# Patient Record
Sex: MALE | Race: OTHER | NOT HISPANIC OR LATINO | URBAN - METROPOLITAN AREA
[De-identification: names, ages, dates, MRNs, and addresses within clinical notes are randomized per-mention and may not be internally consistent; named-entity substitution may affect disease eponyms.]

---

## 2022-04-21 ENCOUNTER — OUTPATIENT (OUTPATIENT)
Dept: OUTPATIENT SERVICES | Facility: HOSPITAL | Age: 47
LOS: 1 days | Discharge: HOME | End: 2022-04-21
Payer: COMMERCIAL

## 2022-04-21 PROCEDURE — 88305 TISSUE EXAM BY PATHOLOGIST: CPT | Mod: 26

## 2022-04-22 LAB — SURGICAL PATHOLOGY STUDY: SIGNIFICANT CHANGE UP

## 2024-04-10 ENCOUNTER — APPOINTMENT (OUTPATIENT)
Dept: NEUROLOGY | Facility: CLINIC | Age: 49
End: 2024-04-10
Payer: COMMERCIAL

## 2024-04-10 VITALS
SYSTOLIC BLOOD PRESSURE: 115 MMHG | OXYGEN SATURATION: 95 % | WEIGHT: 182 LBS | HEIGHT: 73 IN | TEMPERATURE: 97.7 F | HEART RATE: 83 BPM | BODY MASS INDEX: 24.12 KG/M2 | DIASTOLIC BLOOD PRESSURE: 80 MMHG

## 2024-04-10 DIAGNOSIS — R56.9 UNSPECIFIED CONVULSIONS: ICD-10-CM

## 2024-04-10 DIAGNOSIS — Z78.9 OTHER SPECIFIED HEALTH STATUS: ICD-10-CM

## 2024-04-10 PROCEDURE — 99205 OFFICE O/P NEW HI 60 MIN: CPT

## 2024-04-10 PROCEDURE — G2211 COMPLEX E/M VISIT ADD ON: CPT | Mod: NC,1L

## 2024-04-10 RX ORDER — LEVETIRACETAM 500 MG/1
500 TABLET, FILM COATED ORAL TWICE DAILY
Refills: 0 | Status: ACTIVE | COMMUNITY

## 2024-04-16 ENCOUNTER — NON-APPOINTMENT (OUTPATIENT)
Age: 49
End: 2024-04-16

## 2024-04-16 ENCOUNTER — APPOINTMENT (OUTPATIENT)
Dept: NEUROLOGY | Facility: CLINIC | Age: 49
End: 2024-04-16
Payer: COMMERCIAL

## 2024-04-16 PROCEDURE — 95816 EEG AWAKE AND DROWSY: CPT

## 2024-05-15 ENCOUNTER — INPATIENT (INPATIENT)
Facility: HOSPITAL | Age: 49
LOS: 2 days | Discharge: HOME | DRG: 101 | End: 2024-05-18
Attending: PSYCHIATRY & NEUROLOGY | Admitting: PSYCHIATRY & NEUROLOGY
Payer: COMMERCIAL

## 2024-05-15 VITALS
SYSTOLIC BLOOD PRESSURE: 117 MMHG | HEIGHT: 72 IN | WEIGHT: 179.68 LBS | RESPIRATION RATE: 18 BRPM | OXYGEN SATURATION: 96 % | HEART RATE: 59 BPM | TEMPERATURE: 98 F | DIASTOLIC BLOOD PRESSURE: 86 MMHG

## 2024-05-15 DIAGNOSIS — R56.9 UNSPECIFIED CONVULSIONS: ICD-10-CM

## 2024-05-15 DIAGNOSIS — Z29.9 ENCOUNTER FOR PROPHYLACTIC MEASURES, UNSPECIFIED: ICD-10-CM

## 2024-05-15 DIAGNOSIS — Z98.890 OTHER SPECIFIED POSTPROCEDURAL STATES: Chronic | ICD-10-CM

## 2024-05-15 DIAGNOSIS — Z86.39 PERSONAL HISTORY OF OTHER ENDOCRINE, NUTRITIONAL AND METABOLIC DISEASE: ICD-10-CM

## 2024-05-15 LAB
ALBUMIN SERPL ELPH-MCNC: 4.1 G/DL — SIGNIFICANT CHANGE UP (ref 3.3–5)
ALP SERPL-CCNC: 70 U/L — SIGNIFICANT CHANGE UP (ref 40–120)
ALT FLD-CCNC: 18 U/L — SIGNIFICANT CHANGE UP (ref 10–45)
ANION GAP SERPL CALC-SCNC: 10 MMOL/L — SIGNIFICANT CHANGE UP (ref 5–17)
APTT BLD: 35 SEC — SIGNIFICANT CHANGE UP (ref 24.5–35.6)
AST SERPL-CCNC: 14 U/L — SIGNIFICANT CHANGE UP (ref 10–40)
BASOPHILS # BLD AUTO: 0.08 K/UL — SIGNIFICANT CHANGE UP (ref 0–0.2)
BASOPHILS NFR BLD AUTO: 1.8 % — SIGNIFICANT CHANGE UP (ref 0–2)
BILIRUB SERPL-MCNC: 0.4 MG/DL — SIGNIFICANT CHANGE UP (ref 0.2–1.2)
BUN SERPL-MCNC: 10 MG/DL — SIGNIFICANT CHANGE UP (ref 7–23)
CALCIUM SERPL-MCNC: 9 MG/DL — SIGNIFICANT CHANGE UP (ref 8.4–10.5)
CHLORIDE SERPL-SCNC: 106 MMOL/L — SIGNIFICANT CHANGE UP (ref 96–108)
CO2 SERPL-SCNC: 25 MMOL/L — SIGNIFICANT CHANGE UP (ref 22–31)
CREAT SERPL-MCNC: 0.9 MG/DL — SIGNIFICANT CHANGE UP (ref 0.5–1.3)
EGFR: 104 ML/MIN/1.73M2 — SIGNIFICANT CHANGE UP
EOSINOPHIL # BLD AUTO: 0.13 K/UL — SIGNIFICANT CHANGE UP (ref 0–0.5)
EOSINOPHIL NFR BLD AUTO: 3 % — SIGNIFICANT CHANGE UP (ref 0–6)
GLUCOSE SERPL-MCNC: 103 MG/DL — HIGH (ref 70–99)
HCT VFR BLD CALC: 42.8 % — SIGNIFICANT CHANGE UP (ref 39–50)
HGB BLD-MCNC: 14.6 G/DL — SIGNIFICANT CHANGE UP (ref 13–17)
IMM GRANULOCYTES NFR BLD AUTO: 0.5 % — SIGNIFICANT CHANGE UP (ref 0–0.9)
INR BLD: 0.9 — SIGNIFICANT CHANGE UP (ref 0.85–1.18)
LYMPHOCYTES # BLD AUTO: 1.34 K/UL — SIGNIFICANT CHANGE UP (ref 1–3.3)
LYMPHOCYTES # BLD AUTO: 30.5 % — SIGNIFICANT CHANGE UP (ref 13–44)
MAGNESIUM SERPL-MCNC: 2 MG/DL — SIGNIFICANT CHANGE UP (ref 1.6–2.6)
MCHC RBC-ENTMCNC: 33.7 PG — SIGNIFICANT CHANGE UP (ref 27–34)
MCHC RBC-ENTMCNC: 34.1 GM/DL — SIGNIFICANT CHANGE UP (ref 32–36)
MCV RBC AUTO: 98.8 FL — SIGNIFICANT CHANGE UP (ref 80–100)
MONOCYTES # BLD AUTO: 0.34 K/UL — SIGNIFICANT CHANGE UP (ref 0–0.9)
MONOCYTES NFR BLD AUTO: 7.7 % — SIGNIFICANT CHANGE UP (ref 2–14)
NEUTROPHILS # BLD AUTO: 2.48 K/UL — SIGNIFICANT CHANGE UP (ref 1.8–7.4)
NEUTROPHILS NFR BLD AUTO: 56.5 % — SIGNIFICANT CHANGE UP (ref 43–77)
NRBC # BLD: 0 /100 WBCS — SIGNIFICANT CHANGE UP (ref 0–0)
PHOSPHATE SERPL-MCNC: 4.2 MG/DL — SIGNIFICANT CHANGE UP (ref 2.5–4.5)
PLATELET # BLD AUTO: 225 K/UL — SIGNIFICANT CHANGE UP (ref 150–400)
POTASSIUM SERPL-MCNC: 4.2 MMOL/L — SIGNIFICANT CHANGE UP (ref 3.5–5.3)
POTASSIUM SERPL-SCNC: 4.2 MMOL/L — SIGNIFICANT CHANGE UP (ref 3.5–5.3)
PROT SERPL-MCNC: 5.8 G/DL — LOW (ref 6–8.3)
PROTHROM AB SERPL-ACNC: 10.3 SEC — SIGNIFICANT CHANGE UP (ref 9.5–13)
RBC # BLD: 4.33 M/UL — SIGNIFICANT CHANGE UP (ref 4.2–5.8)
RBC # FLD: 11.6 % — SIGNIFICANT CHANGE UP (ref 10.3–14.5)
SODIUM SERPL-SCNC: 141 MMOL/L — SIGNIFICANT CHANGE UP (ref 135–145)
WBC # BLD: 4.39 K/UL — SIGNIFICANT CHANGE UP (ref 3.8–10.5)
WBC # FLD AUTO: 4.39 K/UL — SIGNIFICANT CHANGE UP (ref 3.8–10.5)

## 2024-05-15 PROCEDURE — 93010 ELECTROCARDIOGRAM REPORT: CPT

## 2024-05-15 PROCEDURE — 99223 1ST HOSP IP/OBS HIGH 75: CPT

## 2024-05-15 RX ORDER — LEVETIRACETAM 250 MG/1
250 TABLET, FILM COATED ORAL AT BEDTIME
Refills: 0 | Status: COMPLETED | OUTPATIENT
Start: 2024-05-15 | End: 2024-05-15

## 2024-05-15 RX ORDER — ACETAMINOPHEN 500 MG
650 TABLET ORAL EVERY 6 HOURS
Refills: 0 | Status: DISCONTINUED | OUTPATIENT
Start: 2024-05-15 | End: 2024-05-18

## 2024-05-15 RX ORDER — LEVETIRACETAM 250 MG/1
500 TABLET, FILM COATED ORAL
Refills: 0 | Status: DISCONTINUED | OUTPATIENT
Start: 2024-05-16 | End: 2024-05-16

## 2024-05-15 RX ADMIN — LEVETIRACETAM 250 MILLIGRAM(S): 250 TABLET, FILM COATED ORAL at 21:02

## 2024-05-15 NOTE — H&P ADULT - ASSESSMENT
48 year-old-right-handed man w/ PMH of hemochromatosis (phlebotomy treatments every 4 months), and eye surgery at age 3 who was admitted on 5/15/24 for event characterization while undergoing vEEG monitoring.

## 2024-05-15 NOTE — H&P ADULT - NSHPPHYSICALEXAM_GEN_ALL_CORE
GENERAL APPEARANCE: Well appearing man who appears to be in no acute distress.  EYES: PERRLA 3mm brisk, Mild left eye strabismus, vision is grossly intact.  EARS: External auditory canals and tympanic membranes clear, hearing grossly intact.  NOSE: No nasal discharge.  CARDIAC: Normal S1 and S2. No S3, S4 or murmurs. Rhythm is regular. There is no peripheral edema, cyanosis or pallor. Extremities are warm and well perfused. Capillary refill is less than 2 seconds. No carotid bruits.  LUNGS: Clear to auscultation   ABDOMEN: Positive bowel sounds. Soft, nondistended, nontender. No guarding or rebound. No masses.  EXTREMITIES: No significant deformity or joint abnormality. No edema. Peripheral pulses intact. No varicosities.  SKIN: Skin normal color, texture and turgor with no lesions or eruptions.    Neurological Exam:  Mental Status: Alert and orientated to self, date and place.  Attention intact.  No dysarthria, aphasia or neglect.  Knowledge intact.  Registration intact.  Short and long term memory grossly intact.      Cranial Nerves: CN I - not tested.  PERRLA 3mm brisk, mild left eye strabismus, VFF, no nystagmus or diplopia.  CN V1-3 intact to light touch.  No facial asymmetry.  Hearing intact to finger rub bilaterally.  Tongue, uvula and palate midline.  Sternocleidomastoid and Trapezius intact bilaterally.    Motor:   Tone: normal.                  Strength:     [] Upper extremity                      Delt       Bicep    Tricep                                                  R         5/5        5/5        5/5       5/5                                               L          5/5        5/5        5/5       5/5  [] Lower extremity                       HF          KE          KF        DF         PF                                               R        5/5        5/5        5/5       5/5       5/5                                               L         5/5        5/5       5/5       5/5        5/5  Pronator drift: none                 Dysmetria: None to finger-nose-finger   Sensation: intact to light touch  Deep Tendon Reflexes: 2+ bilateral biceps, triceps, brachioradialis, knee and ankle  Toes flexor bilaterally  Gait: Heel/toe/tandem walking normal and romberg negative

## 2024-05-15 NOTE — H&P ADULT - NSHPSOCIALHISTORY_GEN_ALL_CORE
man who lives with wife and 4 children aged 16, 14, 11 and 8. Director of live action or animation. Denies smoking, drinking or use of illicit drugs.

## 2024-05-15 NOTE — H&P ADULT - PROBLEM SELECTOR PLAN 1
1 seizure event in lifetime one month ago associated w/ tongue laceration, and was started on Keppra 500mg Q12hrs. Admitted to assess for seizure and potential classification while on vEEG monitoring.    Recommendations:  - Continue vEEG monitoring   - Reduce home keppra 500mg Q12hrs to 250/500mg as of tonight then will re-assess tomorrow  - Ativan 2mg IVP PRN for seizures > 2mins  - Labs including keppra level  - Vital Signs & Neurological assessment Q8hrs  - Maintain Seizure/Fall/Aspiration precautions 1 seizure event in lifetime one month ago associated w/ tongue laceration, and was started on Keppra 500mg Q12hrs. Admitted to assess for seizure and potential classification while on vEEG monitoring.    Recommendations:  - Continue vEEG monitoring   - Initiate Vitamin B-complex to assist with potential mood effects from Keppra   - Reduce home keppra 500mg Q12hrs to 250/500mg as of tonight then will re-assess tomorrow  - Ativan 2mg IVP PRN for seizures > 2mins  - Labs including keppra level  - Vital Signs & Neurological assessment Q8hrs  - Maintain Seizure/Fall/Aspiration precautions

## 2024-05-15 NOTE — PATIENT PROFILE ADULT - INTERNATIONAL TRAVEL
Ultrasound IV by ED Staff Procedure Note    Patient meets criteria for US IV insertion. Ultrasound IV verbal education provided to patient. Opportunities for questions given. IV ultrasound technique used for PIV placement:  20 gauge BD Nexiva 1.75\"  RFA location. 1 X Attempt(s). Procedure tolerated well. Primary RN aware of IV placement and added to LDA.                                 Evert Burroughs RN No

## 2024-05-15 NOTE — H&P ADULT - NSHPREVIEWOFSYSTEMS_GEN_ALL_CORE
CONSTITUTIONAL:  No weight loss, fever, chills, weakness or fatigue.  HEENT:  Eyes:  No visual loss, blurred vision, double vision or yellow sclerae. Ears, Nose, Throat:  No hearing loss, sneezing, congestion, runny nose or sore throat.  SKIN:  No rash or itching.  CARDIOVASCULAR:  No chest pain, chest pressure or chest discomfort. No palpitations or edema.  RESPIRATORY:  No shortness of breath, cough or sputum.  GASTROINTESTINAL:  No anorexia, nausea, vomiting or diarrhea. No abdominal pain or blood.  GENITOURINARY: No Burning on urination.   NEUROLOGICAL:  see HPI  MUSCULOSKELETAL:  +Back pain and muscle soreness  HEMATOLOGIC:  No anemia, bleeding or bruising.  LYMPHATICS:  No enlarged nodes. No history of splenectomy.  PSYCHIATRIC:  No history of depression or anxiety.  ENDOCRINOLOGIC:  No reports of sweating, cold or heat intolerance. No polyuria or polydipsia.  ALLERGIES:  No history of asthma, hives, eczema or rhinitis.

## 2024-05-15 NOTE — H&P ADULT - PROBLEM SELECTOR PLAN 3
Nutrition & Prophylaxis:    F: None  E:  K>4, Mg>2, Phos >3  N: Regular diet  DVT PPx: SCDs  GI PPx: Protonix 40 mg PO QD  Dispo: 7 [place]  Code: FULL CODE

## 2024-05-16 ENCOUNTER — TRANSCRIPTION ENCOUNTER (OUTPATIENT)
Age: 49
End: 2024-05-16

## 2024-05-16 PROCEDURE — 99222 1ST HOSP IP/OBS MODERATE 55: CPT

## 2024-05-16 PROCEDURE — 99232 SBSQ HOSP IP/OBS MODERATE 35: CPT

## 2024-05-16 RX ADMIN — Medication 1 TABLET(S): at 12:18

## 2024-05-16 RX ADMIN — LEVETIRACETAM 500 MILLIGRAM(S): 250 TABLET, FILM COATED ORAL at 09:23

## 2024-05-16 NOTE — DISCHARGE NOTE PROVIDER - NSDCCPCAREPLAN_GEN_ALL_CORE_FT
PRINCIPAL DISCHARGE DIAGNOSIS  Diagnosis: Seizure  Assessment and Plan of Treatment: A seizure is abnormal electrical activity in the brain; the specific cause may or may not be found. Prior to a seizure you may experience a warning sensation (aura) that may include fear, nausea, dizziness, and visual changes such as flashing lights of spots. Common symptoms during the seizure may include an altered mental status, rhythmic jerking movements, drooling, grunting, loss of bladder or bowel control, or tongue biting. After a seizure, you may feel confused and sleepy.   Teach friends and family what to do if you HAVE a seizure which includes laying you on the ground with your head on a cushion and turning you to the side to keep your breathing passages clear in case of vomiting.  SEEK IMMEDIATE MEDICAL CARE IF YOU HAVE ANY OF THE FOLLOWING SYMPTOMS: seizure lasting over 5 minutes, not waking up or persistent altered mental status after the seizure, or more frequent or worsening seizures.  Medicine side effects  *Skin rash  *Fever of 100.4°F (38°C) or higher, or as directed by your provider  *Symptoms get worse or you have new symptoms

## 2024-05-16 NOTE — PROGRESS NOTE ADULT - PROBLEM SELECTOR PLAN 1
1 seizure event in lifetime one month ago associated w/ tongue laceration, and was started on Keppra 500mg Q12hrs. Admitted to assess for seizure and potential classification while on vEEG monitoring. EEG on 5/16 w/ 1 questionable sharp wave form in the left frontal region.     Recommendations:  - Continue vEEG monitoring   - Reduce home keppra 500mg Q12hrs to 250/500mg as of tonight then will re-assess tomorrow  - Ativan 2mg IVP PRN for seizures > 2mins  - Pending keppra level  - Vital Signs & Neurological assessment Q8hrs  - Maintain Seizure/Fall/Aspiration precautions. 1 seizure event in lifetime ~ one month ago associated w/ tongue laceration, and was started on Keppra 500mg Q12hrs. Admitted to assess for seizure and potential classification while on vEEG monitoring. EEG on 5/16 w/ 1 questionable sharp wave form in the left frontal region.     - Continue vEEG monitoring   - 5/15 Keppra reduced to 500mg in AM and 250mg in PM  - 5/16 Keppra reduced to 500mg in AM only  - Ativan 2mg IVP PRN for seizures > 2mins  - Pending keppra level  - Vital Signs & Neurological assessment Q8hrs  - Maintain Seizure/Fall/Aspiration precautions. 1 seizure event in lifetime ~ one month ago associated w/ tongue laceration, and was started on Keppra 500mg Q12hrs. Admitted to assess for seizure and potential classification while on vEEG monitoring. EEG on 5/16 w/ 1 questionable sharp wave form in the left temporal region.     - Continue vEEG monitoring   - 5/15 Keppra reduced to 500mg in AM and 250mg in PM  - 5/16 Keppra reduced to 500mg in AM only  - Ativan 2mg IVP PRN for seizures > 2mins  - Pending keppra level  - Vital Signs & Neurological assessment Q8hrs  - Maintain Seizure/Fall/Aspiration precautions.

## 2024-05-16 NOTE — DISCHARGE NOTE PROVIDER - HOSPITAL COURSE
INCOMPLETE NOTE--------------  EPILEPSY DISCHARGE NOTE:  HPI:  48 year-old-right-handed man w/ PMH of hemochromatosis (phlebotomy treatments every 4 months), and eye surgery at age 3 who was admitted on 5/15/24 for event characterization while undergoing vEEG monitoring. Patient states that he was on vacation in [facility] (purpose viewing [place]), and he was at the poolside, and the next he felt warm and bothered as it was loud, and the next his son and family was over him questioning him. As per outpatient provider Dr Marina's note, patient was witnessed with whole body shaking associated with tongue laceration followed by confusion. He had no prior episodes, but was noted with periods of unresponsiveness within the last 4 months leading up to the event. He was admitted at a local hospital where labs and CTH were unrevealing with the exception of transaminitis likely related to hemachromatosis. He was started on Keppra 500mg BID, reports compliance but states that he notes mild fatigue and potentially feeling down since its initiation. Denies identifiable seizure risks such as family hx of seizures, head trauma w/ LOC, febrile seizures, meningitis or encephalitis, developmental delay, stroke or  complications. He had a routine EEG outpatient that was normal. Denies urinary/fecal incontinence, HA, dizziness, change in vision, flu-like symptoms or recent exposure to sick contacts.    Hospital course include vEEG monitoring from 5/15 - ? that showed? Keppra was reduced on 5/15and showed 1 questionable sharp wave discharge in the left frontal region. ...................., and EEG showed.... from date to date. Additional tests performed on date, and the results revealed ...... Medical issues and the interventions (if any). Patient is stable and medically cleared for discharge to home/subacute rehab/acute rehab.     Medications adjusted:    New Medications:    Education:   Patient given written education on SUDEP: YES    Follow-up:  Please follow-up with Dr Marina in 4 - 6 weeks ( will call you with a date and time).   Please follow-up with your PCP in 2 weeks  Please follow-up with other specialists in 2 - 4 weeks.      INCOMPLETE NOTE--------------  EPILEPSY DISCHARGE NOTE:  HPI:  48 year-old-right-handed man w/ PMH of hemochromatosis (phlebotomy treatments every 4 months), and eye surgery at age 3 who was admitted on 5/15/24 for event characterization while undergoing vEEG monitoring. Patient states that he was on vacation in [facility] (purpose viewing [place]), and he was at the poolside, and the next he felt warm and bothered as it was loud, and the next his son and family was over him questioning him. As per outpatient provider Dr Marina's note, patient was witnessed with whole body shaking associated with tongue laceration followed by confusion. He had no prior episodes, but was noted with periods of unresponsiveness within the last 4 months leading up to the event. He was admitted at a local hospital where labs and CTH were unrevealing with the exception of transaminitis likely related to hemachromatosis. He was started on Keppra 500mg BID, reports compliance but states that he notes mild fatigue and potentially feeling down since its initiation. Denies identifiable seizure risks such as family hx of seizures, head trauma w/ LOC, febrile seizures, meningitis or encephalitis, developmental delay, stroke or  complications. He had a routine EEG outpatient that was normal. Denies urinary/fecal incontinence, HA, dizziness, change in vision, flu-like symptoms or recent exposure to sick contacts.    Hospital course include vEEG monitoring from 5/15 - ? that showed? Keppra was reduced on 5/15and showed 1 questionable sharp wave discharge in the left temporal region. ...................., and EEG showed.... from date to date. Additional tests performed on date, and the results revealed ...... Medical issues and the interventions (if any). Patient is stable and medically cleared for discharge to home/subacute rehab/acute rehab.     Medications adjusted:    New Medications:    Education:   Patient given written education on SUDEP: YES    Follow-up:  Please follow-up with Dr Marina in 4 - 6 weeks ( will call you with a date and time).   Please follow-up with your PCP in 2 weeks  Please follow-up with other specialists in 2 - 4 weeks.      INCOMPLETE NOTE--------------  EPILEPSY DISCHARGE NOTE:  HPI:  48 year-old-right-handed man w/ PMH of hemochromatosis (phlebotomy treatments every 4 months), and eye surgery at age 3 who was admitted on 5/15/24 for event characterization while undergoing vEEG monitoring. Patient states that he was on vacation in [facility] (purpose viewing [place]), and he was at the poolside, and the next he felt warm and bothered as it was loud, and the next his son and family was over him questioning him. As per outpatient provider Dr Marina's note, patient was witnessed with whole body shaking associated with tongue laceration followed by confusion. He had no prior episodes, but was noted with periods of unresponsiveness within the last 4 months leading up to the event. He was admitted at a local hospital where labs and CTH were unrevealing with the exception of transaminitis likely related to hemachromatosis. He was started on Keppra 500mg BID, reports compliance but states that he notes mild fatigue and potentially feeling down since its initiation. Denies identifiable seizure risks such as family hx of seizures, head trauma w/ LOC, febrile seizures, meningitis or encephalitis, developmental delay, stroke or  complications. He had a routine EEG outpatient that was normal. Denies urinary/fecal incontinence, HA, dizziness, change in vision, flu-like symptoms or recent exposure to sick contacts.    Hospital course included vEEG monitoring from 5/15 - . Keppra was reduced on 5/15 and showed 1 questionable sharp wave discharge in the left temporal region. Patient is stable and medically cleared for discharge to home.    Medications adjusted: Keppra increased from 500mg BID to 500mg in AM and 1000mg in PM    New Medications: none    Education:   Patient given written education on SUDEP: YES    Follow-up:  Please follow-up with Dr Marina in 4 - 6 weeks ( will call you with a date and time).   Please follow-up with your PCP in 2 weeks  Please follow-up with other specialists in 2 - 4 weeks.        EPILEPSY DISCHARGE NOTE:  HPI:  48 year-old-right-handed man w/ PMH of hemochromatosis (phlebotomy treatments every 4 months), and eye surgery at age 3 who was admitted on 5/15/24 for event characterization while undergoing vEEG monitoring. Patient states that he was on vacation in [facility] (purpose viewing [email or web address]), and he was at the poolside, and the next he felt warm and bothered as it was loud, and the next his son and family was over him questioning him. As per outpatient provider Dr Marina's note, patient was witnessed with whole body shaking associated with tongue laceration followed by confusion. He had no prior episodes, but was noted with periods of unresponsiveness within the last 4 months leading up to the event. He was admitted at a local hospital where labs and CTH were unrevealing with the exception of transaminitis likely related to hemachromatosis. He was started on Keppra 500mg BID, reports compliance but states that he notes mild fatigue and potentially feeling down since its initiation. Denies identifiable seizure risks such as family hx of seizures, head trauma w/ LOC, febrile seizures, meningitis or encephalitis, developmental delay, stroke or  complications. He had a routine EEG outpatient that was normal. Denies urinary/fecal incontinence, HA, dizziness, change in vision, flu-like symptoms or recent exposure to sick contacts.    Hospital course included vEEG monitoring from 5/15 - . Keppra was reduced on 5/15 and showed 1 questionable sharp wave discharge in the left temporal region. Patient is stable and medically cleared for discharge to home.    Medications adjusted: Keppra increased from 500mg BID to 500mg in AM and 1000mg in PM    New Medications: none    Education:   Patient given written education on SUDEP: YES    Follow-up:  Please follow-up with Dr Marina in 4 - 6 weeks ( will call you with a date and time).   Please follow-up with your PCP in 2 weeks  Please follow-up with other specialists in 2 - 4 weeks.

## 2024-05-16 NOTE — PROGRESS NOTE ADULT - SUBJECTIVE AND OBJECTIVE BOX
EPILEPSY PROGRESS NOTE:   No events overnight. No complaints currently.    REVIEW OF SYSTEMS:   As above, otherwise negative for constitutional/HEENT/CV/pulm/GI//MSK/neuro/derm/endocrine/psych.    MEDICATIONS  (STANDING):  levETIRAcetam 500 milliGRAM(s) Oral <User Schedule>    MEDICATIONS  (PRN):  acetaminophen     Tablet .. 650 milliGRAM(s) Oral every 6 hours PRN Temp greater or equal to 38.5C (101.3F), Mild Pain (1 - 3)  LORazepam   Injectable 2 milliGRAM(s) IV Push every 2 hours PRN Seizure Activity    VITAL SIGNS:   T(C): 36.3 (05-16-24 @ 05:55), Max: 36.7 (05-15-24 @ 13:03)  HR: 60 (05-16-24 @ 05:55) (59 - 65)  BP: 108/68 (05-16-24 @ 05:55) (108/68 - 120/78)  RR: 18 (05-16-24 @ 05:55) (18 - 18)  SpO2: 98% (05-16-24 @ 05:55) (95% - 98%)  Wt(kg): --    PHYSICAL EXAM:   Eyes open spontaneously. Conversational with appropriate sentences.  EOMI. Visual fields full. PERRL 3>2. Face symmetrical.  Full strength throughout.  Finger-nose-finger intact R/L.  Intact to light touch throughout.    LABS:   CBC Full  -  ( 15 May 2024 14:00 )  WBC Count : 4.39 K/uL  RBC Count : 4.33 M/uL  Hemoglobin : 14.6 g/dL  Hematocrit : 42.8 %  Platelet Count - Automated : 225 K/uL  Mean Cell Volume : 98.8 fl  Mean Cell Hemoglobin : 33.7 pg  Mean Cell Hemoglobin Concentration : 34.1 gm/dL  Auto Neutrophil # : 2.48 K/uL  Auto Lymphocyte # : 1.34 K/uL  Auto Monocyte # : 0.34 K/uL  Auto Eosinophil # : 0.13 K/uL  Auto Basophil # : 0.08 K/uL  Auto Neutrophil % : 56.5 %  Auto Lymphocyte % : 30.5 %  Auto Monocyte % : 7.7 %  Auto Eosinophil % : 3.0 %  Auto Basophil % : 1.8 %    05-15    141  |  106  |  10  ----------------------------<  103<H>  4.2   |  25  |  0.90    Ca    9.0      15 May 2024 14:00  Phos  4.2     05-15  Mg     2.0     05-15    TPro  5.8<L>  /  Alb  4.1  /  TBili  0.4  /  DBili  x   /  AST  14  /  ALT  18  /  AlkPhos  70  05-15    LIVER FUNCTIONS - ( 15 May 2024 14:00 )  Alb: 4.1 g/dL / Pro: 5.8 g/dL / ALK PHOS: 70 U/L / ALT: 18 U/L / AST: 14 U/L / GGT: x           PT/INR - ( 15 May 2024 14:00 )   PT: 10.3 sec;   INR: 0.90     PTT - ( 15 May 2024 14:00 )  PTT:35.0 sec       EPILEPSY PROGRESS NOTE:   Patient seen and examined at bedside who reports no event overnight.     REVIEW OF SYSTEMS:   CARDIOVASCULAR:  No chest pain, chest pressure or chest discomfort. No palpitations or edema.  RESPIRATORY:  No shortness of breath, cough or sputum.  GASTROINTESTINAL:  No anorexia, nausea, vomiting or diarrhea. No abdominal pain or blood.  GENITOURINARY: No Burning on urination.   NEUROLOGICAL:  see HPI    MEDICATIONS  (STANDING):  levETIRAcetam 500 milliGRAM(s) Oral <User Schedule>    MEDICATIONS  (PRN):  acetaminophen     Tablet .. 650 milliGRAM(s) Oral every 6 hours PRN Temp greater or equal to 38.5C (101.3F), Mild Pain (1 - 3)  LORazepam   Injectable 2 milliGRAM(s) IV Push every 2 hours PRN Seizure Activity    VITAL SIGNS:   T(C): 36.3 (05-16-24 @ 05:55), Max: 36.7 (05-15-24 @ 13:03)  HR: 60 (05-16-24 @ 05:55) (59 - 65)  BP: 108/68 (05-16-24 @ 05:55) (108/68 - 120/78)  RR: 18 (05-16-24 @ 05:55) (18 - 18)  SpO2: 98% (05-16-24 @ 05:55) (95% - 98%)  Wt(kg): --    PHYSICAL EXAM:   Neurologic:     -Mental Status: Alert and oriented to name, place and date. Speech is fluent with intact naming, repetition, and comprehension, no dysarthria or aphasia. Recent and remote memory intact. Follows commands. Attention/concentration intact. Fund of knowledge appropriate.     -Cranial Nerves:          II: Visual fields are full to confrontation.          III, IV, VI: EOMI without nystagmus. PERRLA 3mm brisk b/l          V:  Facial sensation V1-V3 equal and intact           VII: Face is symmetric with normal eye closure and smile          VIII: Hearing is bilaterally intact to finger rub          IX, X: Uvula is midline and soft palate rises symmetrically          XI: Head turning and shoulder shrug are intact.          XII: Tongue protrudes midline     -Motor: Normal bulk and tone. Strength bilateral upper extremity 5/5, bilateral lower extremities 5/5.      -Sensation: Intact to light touch bilaterally.      -Coordination: No dysmetria on finger-to-nose bilaterally     -Reflexes: Downgoing toes bilaterally      -Gait: Heel/toe/tandem walking normal, Romberg's negative     LABS:   CBC Full  -  ( 15 May 2024 14:00 )  WBC Count : 4.39 K/uL  RBC Count : 4.33 M/uL  Hemoglobin : 14.6 g/dL  Hematocrit : 42.8 %  Platelet Count - Automated : 225 K/uL  Mean Cell Volume : 98.8 fl  Mean Cell Hemoglobin : 33.7 pg  Mean Cell Hemoglobin Concentration : 34.1 gm/dL  Auto Neutrophil # : 2.48 K/uL  Auto Lymphocyte # : 1.34 K/uL  Auto Monocyte # : 0.34 K/uL  Auto Eosinophil # : 0.13 K/uL  Auto Basophil # : 0.08 K/uL  Auto Neutrophil % : 56.5 %  Auto Lymphocyte % : 30.5 %  Auto Monocyte % : 7.7 %  Auto Eosinophil % : 3.0 %  Auto Basophil % : 1.8 %    05-15    141  |  106  |  10  ----------------------------<  103<H>  4.2   |  25  |  0.90    Ca    9.0      15 May 2024 14:00  Phos  4.2     05-15  Mg     2.0     05-15    TPro  5.8<L>  /  Alb  4.1  /  TBili  0.4  /  DBili  x   /  AST  14  /  ALT  18  /  AlkPhos  70  05-15    LIVER FUNCTIONS - ( 15 May 2024 14:00 )  Alb: 4.1 g/dL / Pro: 5.8 g/dL / ALK PHOS: 70 U/L / ALT: 18 U/L / AST: 14 U/L / GGT: x           PT/INR - ( 15 May 2024 14:00 )   PT: 10.3 sec;   INR: 0.90     PTT - ( 15 May 2024 14:00 )  PTT:35.0 sec

## 2024-05-16 NOTE — DISCHARGE NOTE PROVIDER - NSDCFUADDINST_GEN_ALL_CORE_FT
Seizure Safety Instructions  1. [facility] law mandates you to self-report your seizure disorder to [facility], and be seizure-free for 1yr before you can drive.   2. Avoid swimming, diving, taking a bath, cooking, use of motarized machineries.  3. Avoid climbing a ladder, trees or any height.  4. Use machines with safety switches.  5. Always be aware of your surroundings and make sure family and friends are aware of your seizures.  6. Use non-breakable dishes.  7. Consider wearing a medical bracelet to inform people you have epilepsy in case of an emergency.

## 2024-05-16 NOTE — DISCHARGE NOTE PROVIDER - NSDCFUSCHEDAPPT_GEN_ALL_CORE_FT
[facility]  NEUROLOGY [address]  Scheduled Appointment: 07/17/2024     [facility]  NEUROLOGY [address]  Scheduled Appointment: 06/27/2024

## 2024-05-16 NOTE — DISCHARGE NOTE PROVIDER - CARE PROVIDER_API CALL
[facility]  Neurology  [address] [address]  Phone: [phone]  Fax: [phone]  Established Patient  Follow Up Time:

## 2024-05-16 NOTE — CONSULT NOTE ADULT - SUBJECTIVE AND OBJECTIVE BOX
48y old  Male who presents with a chief complaint of Seizure classification (16 May 2024 09:37)      HPI:  48 year-old-right-handed man w/ PMH of hemochromatosis (phlebotomy treatments every 4 months), and eye surgery at age 3 admitted to the hospital for vEEG. Patient states that he was on vacation in [facility] (purpose [employer]), and he was at the poolside, and the next he felt warm and bothered as it was loud, and the next his son and family was over him questioning him. He also reports that  he was witnessed with whole body shaking associated with tongue laceration followed by confusion. He had no prior episodes, but was noted with periods of unresponsiveness within the last 4 months leading up to the event. He was admitted at a local hospital where labs and CTH were unrevealing with the exception of transaminitis likely related to hemachromatosis. He was started on Keppra 500mg BID, reports compliance but states that he notes mild fatigue and potentially feeling down since its initiation.     Denies  seizure risks such as family hx of seizures, head trauma w/ LOC, febrile seizures, meningitis or encephalitis, developmental delay, stroke or  complications. He had a routine EEG outpatient that was normal. Denies urinary/fecal incontinence, HA, dizziness, change in vision, flu-like symptoms or recent exposure to sick contacts.          PAST MEDICAL & SURGICAL HISTORY:  History of hemochromatosis      H/O eye surgery            Allergies    No Known Allergies    Intolerances        FAMILY HISTORY:  No hx of Seizures in family , denies child carrion seizures       Social History:   man who lives with wife and 4 children aged 16, 14, 11 and 8. 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 Denies smoking, drinking or use of illicit drugs. (15 May 2024 16:43)      Home Medications:  Keppra 500 mg oral tablet: 1 tab(s) orally every 12 hours (15 May 2024 17:34)          CURRENT  MEDICATIONS:   acetaminophen     Tablet .. 650 milliGRAM(s) Oral every 6 hours PRN  levETIRAcetam 500 milliGRAM(s) Oral <User Schedule>  LORazepam   Injectable 2 milliGRAM(s) IV Push every 2 hours PRN  Nephro-oumar 1 Tablet(s) Oral daily       Diet, Regular (05-15-24 @ 13:09) [Active]          VITAL SIGNS, INS/OUTS (last 24 hours):  Vital Signs Last 24 Hrs  T(C): 36.7 (16 May 2024 11:07), Max: 36.7 (15 May 2024 13:03)  T(F): 98 (16 May 2024 11:07), Max: 98 (15 May 2024 13:03)  HR: 57 (16 May 2024 11:07) (57 - 65)  BP: 100/84 (16 May 2024 11:07) (100/84 - 120/78)  BP(mean): --  RR: 18 (16 May 2024 11:07) (18 - 18)  SpO2: 98% (16 May 2024 11:07) (95% - 98%)    Parameters below as of 16 May 2024 11:07  Patient On (Oxygen Delivery Method): room air      I&O's Summary      Physical Exam   GENERAL: NAD, lying in bed comfortably  EYES: EOMI, PERRLA, conjunctiva and sclera clear  ENT: Moist mucous membranes, no mucosal lesions, normal dentition   NECK: Supple, No JVD  CHEST/LUNG: Clear to auscultation bilaterally; No rales, rhonchi, wheezing, or rubs. Unlabored respirations  HEART: Regular rate and rhythm; No murmurs, rubs, or gallops  ABDOMEN: Bowel sounds present; Soft, Nontender, Nondistended. No hepatomegaly  EXTREMITIES:  2+ Peripheral Pulses,  No clubbing, cyanosis, or edema  NERVOUS SYSTEM:  Alert & Oriented X3, speech clear. No deficits   MSK: FROM all 4 extremities, full and equal strength  SKIN: No rashes or lesions    LABS:                        14.6   4.39  )-----------( 225      ( 15 May 2024 14:00 )             42.8     05-15    141  |  106  |  10  ----------------------------<  103<H>  4.2   |  25  |  0.90    Ca    9.0      15 May 2024 14:00  Phos  4.2     05-15  Mg     2.0     05-15    TPro  5.8<L>  /  Alb  4.1  /  TBili  0.4  /  DBili  x   /  AST  14  /  ALT  18  /  AlkPhos  70  05-15    PT/INR - ( 15 May 2024 14:00 )   PT: 10.3 sec;   INR: 0.90          PTT - ( 15 May 2024 14:00 )  PTT:35.0 sec  Urinalysis Basic - ( 15 May 2024 14:00 )    Color: x / Appearance: x / SG: x / pH: x  Gluc: 103 mg/dL / Ketone: x  / Bili: x / Urobili: x   Blood: x / Protein: x / Nitrite: x   Leuk Esterase: x / RBC: x / WBC x   Sq Epi: x / Non Sq Epi: x / Bacteria: x      CAPILLARY BLOOD GLUCOSE

## 2024-05-16 NOTE — PROGRESS NOTE ADULT - ASSESSMENT
48 year-old-right-handed man w/ PMH of hemochromatosis (phlebotomy treatments every 4 months), and eye surgery at age 3 who was admitted on 5/15/24 for event characterization while undergoing vEEG monitoring. EEG thus far with 1 questionable left frontal sharp wave form.  48 year-old-right-handed man w/ PMH of hemochromatosis (phlebotomy treatments every 4 months), and eye surgery at age 3 who was admitted on 5/15/24 for event characterization while undergoing vEEG monitoring. EEG thus far with 1 questionable left temporal sharp wave form.

## 2024-05-16 NOTE — DISCHARGE NOTE PROVIDER - NSDCMRMEDTOKEN_GEN_ALL_CORE_FT
Keppra 500 mg oral tablet: 1 tab(s) orally every 12 hours   levETIRAcetam 1000 mg oral tablet: 1 tab(s) orally every 24 hours  levETIRAcetam 500 mg oral tablet: 1 tab(s) orally every 24 hours

## 2024-05-16 NOTE — CONSULT NOTE ADULT - ASSESSMENT
48y old  Male who presents with a chief complaint of Seizure classification (16 May 2024 09:37)      Impression and Plan   # 1 episode of Seizure 2 months ago   - vEEG for now   - F/u Epilepsy team plans     # Hemochromatosis   - phlebotomy every 4months , F/u outpatient with Hem- Onc    #DVT PPx - Improve VTE 1 , no need for chemical prophylaxis     #Dispo -  home

## 2024-05-17 VITALS
SYSTOLIC BLOOD PRESSURE: 113 MMHG | HEART RATE: 61 BPM | RESPIRATION RATE: 18 BRPM | OXYGEN SATURATION: 94 % | DIASTOLIC BLOOD PRESSURE: 75 MMHG | TEMPERATURE: 98 F

## 2024-05-17 LAB — LEVETIRACETAM SERPL-MCNC: 10.6 UG/ML — SIGNIFICANT CHANGE UP (ref 10–40)

## 2024-05-17 PROCEDURE — 99231 SBSQ HOSP IP/OBS SF/LOW 25: CPT

## 2024-05-17 PROCEDURE — 99232 SBSQ HOSP IP/OBS MODERATE 35: CPT

## 2024-05-17 RX ORDER — LEVETIRACETAM 250 MG/1
500 TABLET, FILM COATED ORAL ONCE
Refills: 0 | Status: COMPLETED | OUTPATIENT
Start: 2024-05-17 | End: 2024-05-17

## 2024-05-17 RX ORDER — LEVETIRACETAM 250 MG/1
1 TABLET, FILM COATED ORAL
Qty: 30 | Refills: 2
Start: 2024-05-17 | End: 2024-08-14

## 2024-05-17 RX ORDER — LEVETIRACETAM 250 MG/1
500 TABLET, FILM COATED ORAL EVERY 24 HOURS
Refills: 0 | Status: DISCONTINUED | OUTPATIENT
Start: 2024-05-18 | End: 2024-05-18

## 2024-05-17 RX ORDER — LEVETIRACETAM 500 MG/1
500 TABLET, FILM COATED ORAL
Qty: 90 | Refills: 5 | Status: ACTIVE | COMMUNITY
Start: 2024-04-10 | End: 1900-01-01

## 2024-05-17 RX ORDER — LEVETIRACETAM 250 MG/1
1000 TABLET, FILM COATED ORAL EVERY 24 HOURS
Refills: 0 | Status: DISCONTINUED | OUTPATIENT
Start: 2024-05-17 | End: 2024-05-18

## 2024-05-17 RX ORDER — LEVETIRACETAM 250 MG/1
1 TABLET, FILM COATED ORAL
Refills: 0 | DISCHARGE

## 2024-05-17 RX ORDER — LEVETIRACETAM 250 MG/1
1 TABLET, FILM COATED ORAL
Qty: 0 | Refills: 0 | DISCHARGE
Start: 2024-05-17

## 2024-05-17 RX ADMIN — LEVETIRACETAM 500 MILLIGRAM(S): 250 TABLET, FILM COATED ORAL at 12:47

## 2024-05-17 RX ADMIN — LEVETIRACETAM 1000 MILLIGRAM(S): 250 TABLET, FILM COATED ORAL at 21:34

## 2024-05-17 RX ADMIN — Medication 1 TABLET(S): at 12:47

## 2024-05-17 NOTE — PROGRESS NOTE ADULT - PROBLEM SELECTOR PLAN 1
1 seizure event in lifetime ~ one month ago associated w/ tongue laceration, and was started on Keppra 500mg Q12hrs. Admitted to assess for seizure and potential classification while on vEEG monitoring. EEG on 5/16 w/ 1 questionable sharp wave form in the left temporal region.     - Continue vEEG monitoring   - 5/15 Keppra reduced to 500mg in AM and 250mg in PM  - 5/16 Keppra reduced to 500mg in AM only  - 5/17 Off Keppra  - Ativan 2mg IVP PRN for seizures > 2mins  - Vital Signs & Neurological assessment Q8hrs  - Maintain Seizure/Fall/Aspiration precautions. 1 seizure event in lifetime ~ one month ago associated w/ tongue laceration, and was started on Keppra 500mg Q12hrs. Admitted to assess for seizure and potential classification while on vEEG monitoring. EEG on 5/16 w/ 1 questionable sharp wave form in the left temporal region.     - Continue vEEG monitoring   - 5/15 Keppra reduced to 500mg in AM and 250mg in PM  - 5/16 Keppra reduced to 500mg in AM only  - 5/17 Will restart Keppra, plan for 500mg in AM and 1g in PM  - Ativan 2mg IVP PRN for seizures > 2mins  - Vital Signs & Neurological assessment Q8hrs  - Maintain Seizure/Fall/Aspiration precautions.

## 2024-05-17 NOTE — PROGRESS NOTE ADULT - SUBJECTIVE AND OBJECTIVE BOX
Neurology Progress Note    Interval History:    No events overnight. Patient has no complaints this morning.    Medications:  acetaminophen     Tablet .. 650 milliGRAM(s) Oral every 6 hours PRN  LORazepam   Injectable 2 milliGRAM(s) IV Push every 2 hours PRN  Nephro-oumar 1 Tablet(s) Oral daily      Vital Signs Last 24 Hrs  T(C): 36.4 (17 May 2024 05:44), Max: 36.7 (16 May 2024 11:07)  T(F): 97.6 (17 May 2024 05:44), Max: 98 (16 May 2024 11:07)  HR: 55 (17 May 2024 05:44) (55 - 57)  BP: 107/69 (17 May 2024 05:44) (100/84 - 122/81)  BP(mean): --  RR: 18 (17 May 2024 05:44) (18 - 18)  SpO2: 97% (17 May 2024 05:44) (97% - 98%)    Parameters below as of 16 May 2024 20:30  Patient On (Oxygen Delivery Method): room air        Neurological Examination:  General:  Appearance is consistent with chronologic age.  No abnormal facies.  Gross skin survey within normal limits.    Cognitive/Language:  Awake, alert, and oriented to person, place, time and date.  Nondysarthric.    Cranial Nerves  - Eyes:  Visual fields full.  EOMI w/o nystagmus, skew or reported double vision. No ptosis/weakness of eyelid closure.    - Face:  No gross facial asymmetry.    - Ears/Nose/Throat:  Hearing grossly intact  Motor examination:  Upper Extremities: L 5/5, R 5/5; Lower extremities: L 5/5, R 5/5  Sensory examination:   Intact to light touch throughout  Cerebellum:   Gait deferred    Labs:  CBC Full  -  ( 15 May 2024 14:00 )  WBC Count : 4.39 K/uL  RBC Count : 4.33 M/uL  Hemoglobin : 14.6 g/dL  Hematocrit : 42.8 %  Platelet Count - Automated : 225 K/uL  Mean Cell Volume : 98.8 fl  Mean Cell Hemoglobin : 33.7 pg  Mean Cell Hemoglobin Concentration : 34.1 gm/dL  Auto Neutrophil # : 2.48 K/uL  Auto Lymphocyte # : 1.34 K/uL  Auto Monocyte # : 0.34 K/uL  Auto Eosinophil # : 0.13 K/uL  Auto Basophil # : 0.08 K/uL  Auto Neutrophil % : 56.5 %  Auto Lymphocyte % : 30.5 %  Auto Monocyte % : 7.7 %  Auto Eosinophil % : 3.0 %  Auto Basophil % : 1.8 %    05-15    141  |  106  |  10  ----------------------------<  103<H>  4.2   |  25  |  0.90    Ca    9.0      15 May 2024 14:00  Phos  4.2     05-15  Mg     2.0     05-15    TPro  5.8<L>  /  Alb  4.1  /  TBili  0.4  /  DBili  x   /  AST  14  /  ALT  18  /  AlkPhos  70  05-15    LIVER FUNCTIONS - ( 15 May 2024 14:00 )  Alb: 4.1 g/dL / Pro: 5.8 g/dL / ALK PHOS: 70 U/L / ALT: 18 U/L / AST: 14 U/L / GGT: x           PT/INR - ( 15 May 2024 14:00 )   PT: 10.3 sec;   INR: 0.90          PTT - ( 15 May 2024 14:00 )  PTT:35.0 sec  Urinalysis Basic - ( 15 May 2024 14:00 )    Color: x / Appearance: x / SG: x / pH: x  Gluc: 103 mg/dL / Ketone: x  / Bili: x / Urobili: x   Blood: x / Protein: x / Nitrite: x   Leuk Esterase: x / RBC: x / WBC x   Sq Epi: x / Non Sq Epi: x / Bacteria: x

## 2024-05-17 NOTE — PROGRESS NOTE ADULT - ASSESSMENT
48y old  Male w/ pmhx of  hemochromatosis p/w  Seizure.    #Seizure   -Continue EEG  -Seizure precautions   - Management as per Epilepsy   -Pt is for Keppra load as per Epilepsy team     # Hemochromatosis   -Pt is for  phlebotomy every 4months   -Will f/u outpatient with Hem- Onc    #DVT PPx   - As per primary team     #Dispo  -Pt is likely for d/c tomorrow as per Epilepsy team     40 minutes spent on total encounter. The necessity of the time spent during the encounter on this date of service was due to:    Review of hospital course, labs, vitals, radiology and medical records.  Direct patient encounter including bedside exam   Discussed plan of care with primary team   Documenting the encounter.

## 2024-05-17 NOTE — PROGRESS NOTE ADULT - SUBJECTIVE AND OBJECTIVE BOX
Patient was seen and examined at bedside. Case discuss with the primary  team. Pt without any complaints this morning.     OBJECTIVE:  Vital Signs Last 24 Hrs  T(C): 36.4 (17 May 2024 05:44), Max: 36.5 (16 May 2024 20:30)  T(F): 97.6 (17 May 2024 05:44), Max: 97.7 (16 May 2024 20:30)  HR: 55 (17 May 2024 05:44) (55 - 55)  BP: 107/69 (17 May 2024 05:44) (107/69 - 122/81)  RR: 18 (17 May 2024 05:44) (18 - 18)  SpO2: 97% (17 May 2024 05:44) (97% - 98%)    Parameters below as of 16 May 2024 20:30  Patient On (Oxygen Delivery Method): room air    PHYSICAL EXAM:  Gen: NAD laying in bed  CV: RRR, +S1/S2, no mumur  Pulm: CTA b/l no wheezing or crackles   Abd: soft, NTND + BS no rebound or guarding   Neuro: AAOx3; sensation intact; strength 5/5 b/l UE and LE     LABS:                        14.6   4.39  )-----------( 225      ( 15 May 2024 14:00 )             42.8     05-15    141  |  106  |  10  ----------------------------<  103<H>  4.2   |  25  |  0.90    Ca    9.0      15 May 2024 14:00  Phos  4.2     05-15  Mg     2.0     05-15    TPro  5.8<L>  /  Alb  4.1  /  TBili  0.4  /  DBili  x   /  AST  14  /  ALT  18  /  AlkPhos  70  05-15    LIVER FUNCTIONS - ( 15 May 2024 14:00 )  Alb: 4.1 g/dL / Pro: 5.8 g/dL / ALK PHOS: 70 U/L / ALT: 18 U/L / AST: 14 U/L / GGT: x           PT: 10.3 sec;   INR: 0.90    PTT:35.0 sec    acetaminophen     Tablet .. 650 milliGRAM(s) Oral every 6 hours PRN  levETIRAcetam 500 milliGRAM(s) Oral once  levETIRAcetam 1000 milliGRAM(s) Oral every 24 hours  LORazepam   Injectable 2 milliGRAM(s) IV Push every 2 hours PRN  Nephro-oumar 1 Tablet(s) Oral daily

## 2024-05-17 NOTE — PROGRESS NOTE ADULT - ASSESSMENT
48 year-old-right-handed man w/ PMH of hemochromatosis (phlebotomy treatments every 4 months), and eye surgery at age 3 who was admitted on 5/15/24 for event characterization while undergoing vEEG monitoring. EEG thus far with 1 questionable left temporal sharp wave form.

## 2024-05-18 ENCOUNTER — TRANSCRIPTION ENCOUNTER (OUTPATIENT)
Age: 49
End: 2024-05-18

## 2024-05-18 PROCEDURE — 80053 COMPREHEN METABOLIC PANEL: CPT

## 2024-05-18 PROCEDURE — 99232 SBSQ HOSP IP/OBS MODERATE 35: CPT

## 2024-05-18 PROCEDURE — 85025 COMPLETE CBC W/AUTO DIFF WBC: CPT

## 2024-05-18 PROCEDURE — 95716 VEEG EA 12-26HR CONT MNTR: CPT

## 2024-05-18 PROCEDURE — 93005 ELECTROCARDIOGRAM TRACING: CPT

## 2024-05-18 PROCEDURE — 80177 DRUG SCRN QUAN LEVETIRACETAM: CPT

## 2024-05-18 PROCEDURE — 85610 PROTHROMBIN TIME: CPT

## 2024-05-18 PROCEDURE — 99239 HOSP IP/OBS DSCHRG MGMT >30: CPT

## 2024-05-18 PROCEDURE — 85730 THROMBOPLASTIN TIME PARTIAL: CPT

## 2024-05-18 PROCEDURE — 36415 COLL VENOUS BLD VENIPUNCTURE: CPT

## 2024-05-18 PROCEDURE — 95700 EEG CONT REC W/VID EEG TECH: CPT

## 2024-05-18 PROCEDURE — 83735 ASSAY OF MAGNESIUM: CPT

## 2024-05-18 PROCEDURE — 84100 ASSAY OF PHOSPHORUS: CPT

## 2024-05-18 RX ADMIN — LEVETIRACETAM 500 MILLIGRAM(S): 250 TABLET, FILM COATED ORAL at 09:03

## 2024-05-18 NOTE — PROGRESS NOTE ADULT - PROBLEM SELECTOR PLAN 3
Nutrition & Prophylaxis:    F: None  E:  K>4, Mg>2, Phos >3  N: Regular diet  DVT PPx: SCDs  GI PPx: Protonix 40 mg PO QD  Dispo: 7 [place]  Code: FULL CODE.

## 2024-05-18 NOTE — PROGRESS NOTE ADULT - SUBJECTIVE AND OBJECTIVE BOX
Neurology Progress Note    Interval History:    Patient was seen and examined, no acute event over night.     Medications:  acetaminophen     Tablet .. 650 milliGRAM(s) Oral every 6 hours PRN  levETIRAcetam 500 milliGRAM(s) Oral every 24 hours  levETIRAcetam 1000 milliGRAM(s) Oral every 24 hours  LORazepam   Injectable 2 milliGRAM(s) IV Push every 2 hours PRN  Nephro-oumar 1 Tablet(s) Oral daily      Vital Signs Last 24 Hrs  T(C): 36.8 (17 May 2024 20:30), Max: 36.8 (17 May 2024 20:30)  T(F): 98.3 (17 May 2024 20:30), Max: 98.3 (17 May 2024 20:30)  HR: 61 (17 May 2024 20:30) (61 - 65)  BP: 113/75 (17 May 2024 20:30) (112/67 - 113/75)  BP(mean): --  RR: 18 (17 May 2024 20:30) (16 - 18)  SpO2: 94% (17 May 2024 20:30) (94% - 96%)    Parameters below as of 17 May 2024 20:30  Patient On (Oxygen Delivery Method): room air      Neurological Examination:  General:  Appearance is consistent with chronologic age.  No abnormal facies.  Gross skin survey within normal limits.    Cognitive/Language:  Awake, alert, and oriented to person, place, time and date.  Nondysarthric.    Cranial Nerves  - Eyes:  Visual fields full.  EOMI w/o nystagmus, skew or reported double vision. No ptosis/weakness of eyelid closure.    - Face:  No gross facial asymmetry.    - Ears/Nose/Throat:  Hearing grossly intact  Motor examination:  Upper Extremities: L 5/5, R 5/5; Lower extremities: L 5/5, R 5/5  Sensory examination:   Intact to light touch throughout  Cerebellum:   Gait deferred

## 2024-05-18 NOTE — PROGRESS NOTE ADULT - SUBJECTIVE AND OBJECTIVE BOX
Patient was seen and examined at bedside. Case discuss with the primary  team. Pt without any complaints or events overnight.     OBJECTIVE:  Vital Signs Last 24 Hrs  T(C): 36.8 (17 May 2024 20:30), Max: 36.8 (17 May 2024 20:30)  T(F): 98.3 (17 May 2024 20:30), Max: 98.3 (17 May 2024 20:30)  HR: 61 (17 May 2024 20:30) (61 - 65)  BP: 113/75 (17 May 2024 20:30) (112/67 - 113/75)  RR: 18 (17 May 2024 20:30) (16 - 18)  SpO2: 94% (17 May 2024 20:30) (94% - 96%)    Parameters below as of 17 May 2024 20:30  Patient On (Oxygen Delivery Method): room air    PHYSICAL EXAM:  Gen: NAD laying in bed  CV: RRR, +S1/S2, no mumur  Pulm: CTA b/l no wheezing or crackles   Abd: soft, NTND + BS no rebound or guarding     acetaminophen     Tablet .. 650 milliGRAM(s) Oral every 6 hours PRN  levETIRAcetam 500 milliGRAM(s) Oral every 24 hours  levETIRAcetam 1000 milliGRAM(s) Oral every 24 hours  LORazepam   Injectable 2 milliGRAM(s) IV Push every 2 hours PRN  Nephro-oumar 1 Tablet(s) Oral daily

## 2024-05-18 NOTE — PROGRESS NOTE ADULT - ASSESSMENT
48y old  Male w/ pmhx of  hemochromatosis p/w seizure.    #Seizure   -Continue EEG  -Seizure precautions   - Management as per Epilepsy   -Continue Keppra    # Hemochromatosis   -Pt is for phlebotomy every 4months   -Will f/u outpatient with Hem- Onc    #DVT PPx   - As per primary team     #Dispo  -Pt is for d/c today     40 minutes spent on total encounter. The necessity of the time spent during the encounter on this date of service was due to:    Review of hospital course, labs, vitals, radiology and medical records.  Direct patient encounter including bedside exam   Discussed plan of care with primary team   Documenting the encounter.

## 2024-05-18 NOTE — PROGRESS NOTE ADULT - PROBLEM SELECTOR PLAN 2
Hx of hemochromatosis    - Phlebotomy every 4 months and F/U w/ hematologist.

## 2024-05-18 NOTE — DISCHARGE NOTE NURSING/CASE MANAGEMENT/SOCIAL WORK - PATIENT PORTAL LINK FT
You can access the FollowMyHealth Patient Portal offered by [facility] by registering at the following website: [email or web address]. By joining [employer]’s FollowMyHealth portal, you will also be able to view your health information using other applications (apps) compatible with our system.

## 2024-05-18 NOTE — PROGRESS NOTE ADULT - PROBLEM SELECTOR PLAN 1
1 seizure event in lifetime ~ one month ago associated w/ tongue laceration, and was started on Keppra 500mg Q12hrs. Admitted to assess for seizure and potential classification while on vEEG monitoring. EEG on 5/16 w/ 1 questionable sharp wave form in the left temporal region.     - Continue vEEG monitoring   - 5/15 Keppra reduced to 500mg in AM and 250mg in PM  - 5/16 Keppra reduced to 500mg in AM only  - 5/17 Will restart Keppra, plan for 500mg in AM and 1g in PM  - Ativan 2mg IVP PRN for seizures > 2mins  - Vital Signs & Neurological assessment Q8hrs  - Maintain Seizure/Fall/Aspiration precautions.

## 2024-05-18 NOTE — PROGRESS NOTE ADULT - PROBLEM SELECTOR PROBLEM 2
History of hemochromatosis
Spine appears normal, range of motion is not limited, no muscle or joint tenderness. No calf edema/tenderness.

## 2024-05-18 NOTE — DISCHARGE NOTE NURSING/CASE MANAGEMENT/SOCIAL WORK - NSDCPEFALRISK_GEN_ALL_CORE
For information on Fall & Injury Prevention, visit: [email or web address] OR  [email or web address] OR  https://www.cdc.gov/steadi/patient.html

## 2024-05-24 DIAGNOSIS — R56.9 UNSPECIFIED CONVULSIONS: ICD-10-CM

## 2024-05-24 DIAGNOSIS — E83.119 HEMOCHROMATOSIS, UNSPECIFIED: ICD-10-CM

## 2024-06-27 ENCOUNTER — APPOINTMENT (OUTPATIENT)
Dept: NEUROLOGY | Facility: CLINIC | Age: 49
End: 2024-06-27
Payer: COMMERCIAL

## 2024-06-27 VITALS
DIASTOLIC BLOOD PRESSURE: 81 MMHG | BODY MASS INDEX: 23.6 KG/M2 | OXYGEN SATURATION: 98 % | WEIGHT: 178.05 LBS | HEIGHT: 73 IN | SYSTOLIC BLOOD PRESSURE: 118 MMHG | TEMPERATURE: 97.3 F | HEART RATE: 55 BPM

## 2024-06-27 PROCEDURE — G2211 COMPLEX E/M VISIT ADD ON: CPT | Mod: NC

## 2024-06-27 PROCEDURE — 99214 OFFICE O/P EST MOD 30 MIN: CPT

## 2024-08-15 PROBLEM — Z86.39 PERSONAL HISTORY OF OTHER ENDOCRINE, NUTRITIONAL AND METABOLIC DISEASE: Chronic | Status: ACTIVE | Noted: 2024-05-15

## 2024-09-18 ENCOUNTER — APPOINTMENT (OUTPATIENT)
Dept: NEUROLOGY | Facility: CLINIC | Age: 49
End: 2024-09-18
Payer: COMMERCIAL

## 2024-09-18 PROCEDURE — 99213 OFFICE O/P EST LOW 20 MIN: CPT | Mod: 95

## 2024-09-18 PROCEDURE — G2211 COMPLEX E/M VISIT ADD ON: CPT | Mod: NC

## 2024-09-18 RX ORDER — LEVETIRACETAM 500 MG/1
500 TABLET, FILM COATED, EXTENDED RELEASE ORAL
Qty: 60 | Refills: 5 | Status: ACTIVE | COMMUNITY
Start: 2024-09-18 | End: 1900-01-01

## 2024-09-26 ENCOUNTER — APPOINTMENT (OUTPATIENT)
Dept: MRI IMAGING | Age: 49
End: 2024-09-26

## 2024-09-26 PROCEDURE — 70551 MRI BRAIN STEM W/O DYE: CPT

## 2024-09-26 PROCEDURE — 0866T QUAN MRI ALYS BRN W/DX MRI: CPT

## 2024-12-18 ENCOUNTER — APPOINTMENT (OUTPATIENT)
Dept: NEUROLOGY | Facility: CLINIC | Age: 49
End: 2024-12-18
Payer: COMMERCIAL

## 2024-12-18 ENCOUNTER — NON-APPOINTMENT (OUTPATIENT)
Age: 49
End: 2024-12-18

## 2024-12-18 VITALS
HEART RATE: 63 BPM | OXYGEN SATURATION: 99 % | TEMPERATURE: 98.9 F | SYSTOLIC BLOOD PRESSURE: 125 MMHG | WEIGHT: 176 LBS | BODY MASS INDEX: 23.33 KG/M2 | HEIGHT: 73 IN | DIASTOLIC BLOOD PRESSURE: 84 MMHG

## 2024-12-18 PROCEDURE — 99213 OFFICE O/P EST LOW 20 MIN: CPT

## 2024-12-18 PROCEDURE — G2211 COMPLEX E/M VISIT ADD ON: CPT | Mod: NC

## 2024-12-23 RX ORDER — LACOSAMIDE 50 MG/1
50 TABLET ORAL
Qty: 120 | Refills: 3 | Status: ACTIVE | OUTPATIENT
Start: 2024-12-18

## 2025-05-05 ENCOUNTER — INPATIENT (INPATIENT)
Facility: HOSPITAL | Age: 50
LOS: 1 days | Discharge: HOME | End: 2025-05-07
Attending: PSYCHIATRY & NEUROLOGY | Admitting: PSYCHIATRY & NEUROLOGY
Payer: MEDICAID

## 2025-05-05 VITALS
SYSTOLIC BLOOD PRESSURE: 124 MMHG | TEMPERATURE: 98 F | HEIGHT: 73 IN | OXYGEN SATURATION: 97 % | RESPIRATION RATE: 16 BRPM | HEART RATE: 55 BPM | DIASTOLIC BLOOD PRESSURE: 87 MMHG

## 2025-05-05 DIAGNOSIS — R56.9 UNSPECIFIED CONVULSIONS: ICD-10-CM

## 2025-05-05 DIAGNOSIS — Z98.890 OTHER SPECIFIED POSTPROCEDURAL STATES: Chronic | ICD-10-CM

## 2025-05-05 DIAGNOSIS — Z29.9 ENCOUNTER FOR PROPHYLACTIC MEASURES, UNSPECIFIED: ICD-10-CM

## 2025-05-05 LAB
ALBUMIN SERPL ELPH-MCNC: 5 G/DL — SIGNIFICANT CHANGE UP (ref 3.3–5)
ALP SERPL-CCNC: 64 U/L — SIGNIFICANT CHANGE UP (ref 40–120)
ALT FLD-CCNC: 17 U/L — SIGNIFICANT CHANGE UP (ref 10–45)
ANION GAP SERPL CALC-SCNC: 13 MMOL/L — SIGNIFICANT CHANGE UP (ref 5–17)
APTT BLD: 33.3 SEC — SIGNIFICANT CHANGE UP (ref 26.1–36.8)
AST SERPL-CCNC: 16 U/L — SIGNIFICANT CHANGE UP (ref 10–40)
BILIRUB SERPL-MCNC: 0.6 MG/DL — SIGNIFICANT CHANGE UP (ref 0.2–1.2)
BUN SERPL-MCNC: 10 MG/DL — SIGNIFICANT CHANGE UP (ref 7–23)
CALCIUM SERPL-MCNC: 10 MG/DL — SIGNIFICANT CHANGE UP (ref 8.4–10.5)
CHLORIDE SERPL-SCNC: 106 MMOL/L — SIGNIFICANT CHANGE UP (ref 96–108)
CO2 SERPL-SCNC: 24 MMOL/L — SIGNIFICANT CHANGE UP (ref 22–31)
CREAT SERPL-MCNC: 0.86 MG/DL — SIGNIFICANT CHANGE UP (ref 0.5–1.3)
EGFR: 105 ML/MIN/1.73M2 — SIGNIFICANT CHANGE UP
EGFR: 105 ML/MIN/1.73M2 — SIGNIFICANT CHANGE UP
GLUCOSE SERPL-MCNC: 117 MG/DL — HIGH (ref 70–99)
HCT VFR BLD CALC: 47.1 % — SIGNIFICANT CHANGE UP (ref 39–50)
HGB BLD-MCNC: 16.5 G/DL — SIGNIFICANT CHANGE UP (ref 13–17)
INR BLD: 0.95 — SIGNIFICANT CHANGE UP (ref 0.85–1.16)
MAGNESIUM SERPL-MCNC: 2.3 MG/DL — SIGNIFICANT CHANGE UP (ref 1.6–2.6)
MCHC RBC-ENTMCNC: 32 PG — SIGNIFICANT CHANGE UP (ref 27–34)
MCHC RBC-ENTMCNC: 35 G/DL — SIGNIFICANT CHANGE UP (ref 32–36)
MCV RBC AUTO: 91.3 FL — SIGNIFICANT CHANGE UP (ref 80–100)
NRBC BLD AUTO-RTO: 0 /100 WBCS — SIGNIFICANT CHANGE UP (ref 0–0)
PHOSPHATE SERPL-MCNC: 3.9 MG/DL — SIGNIFICANT CHANGE UP (ref 2.5–4.5)
PLATELET # BLD AUTO: 253 K/UL — SIGNIFICANT CHANGE UP (ref 150–400)
POTASSIUM SERPL-MCNC: 5.7 MMOL/L — HIGH (ref 3.5–5.3)
POTASSIUM SERPL-SCNC: 5.7 MMOL/L — HIGH (ref 3.5–5.3)
PROT SERPL-MCNC: 7.2 G/DL — SIGNIFICANT CHANGE UP (ref 6–8.3)
PROTHROM AB SERPL-ACNC: 10.9 SEC — SIGNIFICANT CHANGE UP (ref 9.9–13.4)
RBC # BLD: 5.16 M/UL — SIGNIFICANT CHANGE UP (ref 4.2–5.8)
RBC # FLD: 12.6 % — SIGNIFICANT CHANGE UP (ref 10.3–14.5)
SODIUM SERPL-SCNC: 143 MMOL/L — SIGNIFICANT CHANGE UP (ref 135–145)
WBC # BLD: 5.2 K/UL — SIGNIFICANT CHANGE UP (ref 3.8–10.5)
WBC # FLD AUTO: 5.2 K/UL — SIGNIFICANT CHANGE UP (ref 3.8–10.5)

## 2025-05-05 PROCEDURE — 93010 ELECTROCARDIOGRAM REPORT: CPT

## 2025-05-05 PROCEDURE — 99223 1ST HOSP IP/OBS HIGH 75: CPT

## 2025-05-05 RX ORDER — ENOXAPARIN SODIUM 100 MG/ML
40 INJECTION SUBCUTANEOUS EVERY 24 HOURS
Refills: 0 | Status: DISCONTINUED | OUTPATIENT
Start: 2025-05-05 | End: 2025-05-07

## 2025-05-05 RX ORDER — ACETAMINOPHEN 500 MG/5ML
650 LIQUID (ML) ORAL EVERY 6 HOURS
Refills: 0 | Status: DISCONTINUED | OUTPATIENT
Start: 2025-05-05 | End: 2025-05-07

## 2025-05-05 RX ORDER — LORAZEPAM 4 MG/ML
2 VIAL (ML) INJECTION ONCE
Refills: 0 | Status: DISCONTINUED | OUTPATIENT
Start: 2025-05-05 | End: 2025-05-07

## 2025-05-05 NOTE — H&P ADULT - NSHPSOCIALHISTORY_GEN_ALL_CORE
Lives with wife, and 3 sons and 1 daughter. Freelancer in commercial advertising. Sober over 1 year (stopped drinking after 3/2024 seizure). Denies any smoking/drug use.

## 2025-05-05 NOTE — H&P ADULT - ASSESSMENT
50 y/o RHM with PMHx of seizures presents for elective EMU admission for seizure characterization with medication reduction while undergoing vEEG monitoring.

## 2025-05-05 NOTE — H&P ADULT - NSHPLABSRESULTS_GEN_ALL_CORE
CBC Full  -  ( 05 May 2025 14:45 )  WBC Count : 5.20 K/uL  RBC Count : 5.16 M/uL  Hemoglobin : 16.5 g/dL  Hematocrit : 47.1 %  Platelet Count - Automated : 253 K/uL  Mean Cell Volume : 91.3 fl  Mean Cell Hemoglobin : 32.0 pg  Mean Cell Hemoglobin Concentration : 35.0 g/dL  Auto Neutrophil # : x  Auto Lymphocyte # : x  Auto Monocyte # : x  Auto Eosinophil # : x  Auto Basophil # : x  Auto Neutrophil % : x  Auto Lymphocyte % : x  Auto Monocyte % : x  Auto Eosinophil % : x  Auto Basophil % : x    05-05    143  |  106  |  10  ----------------------------<  117[H]  5.7[H]   |  24  |  0.86    Ca    10.0      05 May 2025 15:12  Phos  3.9     05-05  Mg     2.3     05-05    TPro  7.2  /  Alb  5.0  /  TBili  0.6  /  DBili  x   /  AST  16  /  ALT  17  /  AlkPhos  64  05-05    LIVER FUNCTIONS - ( 05 May 2025 15:12 )  Alb: 5.0 g/dL / Pro: 7.2 g/dL / ALK PHOS: 64 U/L / ALT: 17 U/L / AST: 16 U/L / GGT: x           PT/INR - ( 05 May 2025 14:45 )   PT: 10.9 sec;   INR: 0.95     PTT - ( 05 May 2025 14:45 )  PTT:33.3 sec

## 2025-05-05 NOTE — H&P ADULT - NSHPREVIEWOFSYSTEMS_GEN_ALL_CORE
CONSTITUTIONAL:  No weight loss, fever, chills, weakness or fatigue.   HEENT:  Eyes:  No visual loss, blurred vision, double vision or yellow sclerae. Ears, Nose, Throat:  No hearing loss, sneezing, congestion, runny nose or sore throat.  SKIN:  No rash or itching.  CARDIOVASCULAR:  No chest pain, chest pressure or chest discomfort. No palpitations or edema.  RESPIRATORY:  No shortness of breath, cough or sputum.  GASTROINTESTINAL:  No anorexia, nausea, vomiting or diarrhea. No abdominal pain or blood.  NEUROLOGICAL:  see HPI

## 2025-05-05 NOTE — H&P ADULT - NSHPPHYSICALEXAM_GEN_ALL_CORE
Constitutional: Appearance is consistent with chronologic age. No acute distress    Neurologic:  -Mental status: Awake, alert, oriented to person, place, and date. Speech is fluent with intact naming, repetition, and comprehension, no dysarthria. Recent and remote memory intact. Follows commands. Attention/concentration intact.   -Cranial nerves:   II: Visual fields are full to confrontation.  III, IV, VI: Extraocular movements are intact without nystagmus. Pupils equally round and reactive to light. 3mm Brisk.   V:  Facial sensation V1-V3 equal and intact   VII: Face is symmetric with normal eye closure and smile  VIII: Hearing is bilaterally intact to finger rub  IX, X: Uvula is midline and soft palate rises symmetrically  XI: Head turning and shoulder shrug are intact.  XII: Tongue protrudes midline  Motor: Normal bulk and tone. No pronator drift.   Strength:     [] Upper extremity                      Delt       Bicep    Tricep                                                  R         5/5        5/5        5/5       5/5                                               L          5/5        5/5        5/5       5/5  [] Lower extremity                       HF          KE          KF        DF         PF                                               R        5/5        5/5        5/5       5/5       5/5                                               L         5/5        5/5       5/5       5/5        5/5    Rapid alternating movements intact and symmetric  Sensation: Intact to light touch in all extremities.  Coordination: No dysmetria on finger-to-nose and heel-to-shin bilaterally  Reflexes: 2+ b/l biceps, triceps, patella and achilles. Plantar response downgoing b/l   Gait: Toe/heel/ Tandem/ Romberg Constitutional: Appearance is consistent with chronologic age. No acute distress    Neurologic:  -Mental status: Awake, alert, oriented to person, place, and date. Speech is fluent with intact naming, repetition, and comprehension, no dysarthria. Recent and remote memory intact. Follows commands. Attention/concentration intact.   -Cranial nerves:   II: Visual fields are full to confrontation.  III, IV, VI: Extraocular movements are intact without nystagmus. Pupils equally round and reactive to light. 3mm Brisk.   V:  Facial sensation V1-V3 equal and intact   VII: Face is symmetric with normal eye closure and smile  VIII: Hearing is bilaterally intact to finger rub  IX, X: Uvula is midline and soft palate rises symmetrically  XI: Head turning and shoulder shrug are intact.  XII: Tongue protrudes midline  Motor: Normal bulk and tone. No pronator drift.   Strength:     [] Upper extremity                      Delt       Bicep    Tricep                                                  R         5/5        5/5        5/5       5/5                                               L          5/5        5/5        5/5       5/5  [] Lower extremity                       HF          KE          KF        DF         PF                                               R        5/5        5/5        5/5       5/5       5/5                                               L         5/5        5/5       5/5       5/5        5/5    Sensation: Intact to light touch in all extremities.  Coordination: No dysmetria on finger-to-nose and heel-to-shin bilaterally  Reflexes: 2+ b/l biceps, triceps, patella and achilles. Plantar response downgoing b/l   Gait: Deferred

## 2025-05-05 NOTE — H&P ADULT - PROBLEM SELECTOR PLAN 1
Hx of an episode of convulsive seizure in 2024, seizure free for 1 year,  EMU Admission with plans for med reduction to determine sz risk     1. Admit to EMU  2. VEEG monitoring  3. Ativan 2mg IVP PRN for seizures > 2mins  4. Follow up Labs and AED levels  5. Neurological & Vitals assessment Q8hrs  6. Maintain Seizure/Fall/Aspiration precautions

## 2025-05-05 NOTE — H&P ADULT - PROBLEM SELECTOR PROBLEM 1
[facility] contacted pt to discuss caregiver plan. Pt states primary caregiver will be daughter Sybil Teixeira ([phone]) backup caregivers will be brother Williams Hendrix ([phone]) and sister Albina Godinez ([phone]). SWI confirmed with duties and roles with all three caregivers. Caregivers all state they are willing and know the responsibilities involved.       Patient presents as an acceptable candidate for lung transplant at this time. Based on psychosocial risk factors, patient presents as medium risk, due to the need for a stable financial plan including fundraising. Pt has established a strong caregiver plan and reports he plans to ask other family members and friends to ensure multiple back-ups.       Seizure

## 2025-05-05 NOTE — H&P ADULT - PROBLEM SELECTOR PLAN 2
Nutrition & Prophylaxis:    F: None  E:  K>4, Mg>2, Phos >3  N: Regular diet  DVT PPx: SCDs + lovenox 40mg QHS  GI PPx: None  Dispo: EMU  Code: FULL CODE

## 2025-05-05 NOTE — H&P ADULT - HISTORY OF PRESENT ILLNESS
50 y/o RHM with PMHx of seizures presents for elective EMU admission for seizure characterization with medication reduction while undergoing vEEG monitoring. New onset convulsive seizure of unclear etiology, possibly triggered by stress. Of note, experiencing episodes of unresponsiveness and hot/cold flashes in weeks and months leading up to episode in 2024, possibly indicative of seizure aura. CTH and labs in OSH ER were unrevealing except for transaminitis likely related to hemachromatosis. On Dec 2024 patient stopped taking keppra because of depressed mood and fatigue side effects. Denies any post-itcal symptoms or tongue bite, urinary or bowel incontinence. Denies any head trauma with LOC, febrile seizures, family history of seizures, meningitis, or encephalitis. MRI brain from 9/2024 normal as per outpatient chart. EEG from OSH questionably epileptiform L temporal sharp wave. 50 y/o RHM with PMHx of hemochromatosis, eye surgery and 1 lifetime GTC seizure presents for elective EMU admission for seizure characterization while undergoing vEEG monitoring. Patient endorsed new onset convulsive seizure in March 2024 when he was visiting [place], [place]. He reported he was at a hotel's indoor pool, the pool was packed with people, the air was hot/muggy, it was loud/echoy and the floors were slippery. He went ahead and stood up and started feeling lightheaded, started seeing a prism lens of colors, and fell. He's unsure if he fell due to slipping or due to the seizure. Seizure was witnessed by his youngest (daughter) and one of his sons, who instructed the youngest to watch while he went to find help. His wife and 2 older sons came to the scene and drove him to the hospital as there was no /camera or staff on duty and no one called EMS. He endorsed tongue and an unknown period of confusion/unconsiousness/semi-consciousness. He stated he was back to baseline when they got to the ER. He endorses a lot of built up stress throughout the years from work, financial and life stressors. Prior to seizure he was laid off and reported lack of  quality sleep. Of note, experiencing episodes of unresponsiveness and hot/cold flashes in weeks and months leading up to episode in 2024. Patient has stopped taking Keppra because of depressed mood and fatigue side effects and was transitioned to Vimpat but stopped taking that as well because he didn't like how it made him feel, "foggy and accident-prone". Since episode in March 2024, patient denies any auras/events/seizures. CTH and labs in OSH ER were unrevealing except for transaminitis likely related to hemachromatosis.   Denies any urinary or bowel incontinence head trauma with LOC prior to 2024, febrile seizures, family history of seizures, meningitis, or encephalitis. MRI brain from 9/2024 normal as per outpatient chart. EEG from OSH questionably epileptiform L temporal sharp wave.

## 2025-05-06 ENCOUNTER — TRANSCRIPTION ENCOUNTER (OUTPATIENT)
Age: 50
End: 2025-05-06

## 2025-05-06 LAB
ANION GAP SERPL CALC-SCNC: 6 MMOL/L — SIGNIFICANT CHANGE UP (ref 5–17)
BUN SERPL-MCNC: 8 MG/DL — SIGNIFICANT CHANGE UP (ref 7–23)
CALCIUM SERPL-MCNC: 8.5 MG/DL — SIGNIFICANT CHANGE UP (ref 8.4–10.5)
CHLORIDE SERPL-SCNC: 111 MMOL/L — HIGH (ref 96–108)
CO2 SERPL-SCNC: 26 MMOL/L — SIGNIFICANT CHANGE UP (ref 22–31)
CREAT SERPL-MCNC: 0.8 MG/DL — SIGNIFICANT CHANGE UP (ref 0.5–1.3)
EGFR: 107 ML/MIN/1.73M2 — SIGNIFICANT CHANGE UP
EGFR: 107 ML/MIN/1.73M2 — SIGNIFICANT CHANGE UP
GLUCOSE SERPL-MCNC: 84 MG/DL — SIGNIFICANT CHANGE UP (ref 70–99)
POTASSIUM SERPL-MCNC: 4.9 MMOL/L — SIGNIFICANT CHANGE UP (ref 3.5–5.3)
POTASSIUM SERPL-SCNC: 4.9 MMOL/L — SIGNIFICANT CHANGE UP (ref 3.5–5.3)
SODIUM SERPL-SCNC: 143 MMOL/L — SIGNIFICANT CHANGE UP (ref 135–145)

## 2025-05-06 PROCEDURE — 99222 1ST HOSP IP/OBS MODERATE 55: CPT

## 2025-05-06 PROCEDURE — 95700 EEG CONT REC W/VID EEG TECH: CPT

## 2025-05-06 PROCEDURE — 85610 PROTHROMBIN TIME: CPT

## 2025-05-06 PROCEDURE — 84100 ASSAY OF PHOSPHORUS: CPT

## 2025-05-06 PROCEDURE — 93005 ELECTROCARDIOGRAM TRACING: CPT

## 2025-05-06 PROCEDURE — 36415 COLL VENOUS BLD VENIPUNCTURE: CPT

## 2025-05-06 PROCEDURE — 99232 SBSQ HOSP IP/OBS MODERATE 35: CPT

## 2025-05-06 PROCEDURE — 80048 BASIC METABOLIC PNL TOTAL CA: CPT

## 2025-05-06 PROCEDURE — 95716 VEEG EA 12-26HR CONT MNTR: CPT

## 2025-05-06 PROCEDURE — 80053 COMPREHEN METABOLIC PANEL: CPT

## 2025-05-06 PROCEDURE — 85730 THROMBOPLASTIN TIME PARTIAL: CPT

## 2025-05-06 PROCEDURE — 95720 EEG PHY/QHP EA INCR W/VEEG: CPT

## 2025-05-06 PROCEDURE — 83735 ASSAY OF MAGNESIUM: CPT

## 2025-05-06 PROCEDURE — 80177 DRUG SCRN QUAN LEVETIRACETAM: CPT

## 2025-05-06 PROCEDURE — 80235 DRUG ASSAY LACOSAMIDE: CPT

## 2025-05-06 PROCEDURE — 85027 COMPLETE CBC AUTOMATED: CPT

## 2025-05-06 NOTE — DISCHARGE NOTE PROVIDER - NSDCCPTREATMENT_GEN_ALL_CORE_FT
PRINCIPAL PROCEDURE  Procedure: EEG, with video recording, 36-60 hours  Findings and Treatment: EEG showed no electrographic or clincial seizure captured

## 2025-05-06 NOTE — DISCHARGE NOTE PROVIDER - CARE PROVIDER_API CALL
Tracie Marina  Neurology  [address] [address]  Phone: [phone]  Fax: [phone]  Follow Up Time:

## 2025-05-06 NOTE — DISCHARGE NOTE PROVIDER - NSDCFUSCHEDAPPT_GEN_ALL_CORE_FT
[facility]  NEUROLOGY [address]  Scheduled Appointment: 05/14/2025     Tracie Marina Physician [facility]  NEUROLOGY [address]  Scheduled Appointment: 05/14/2025    Tracie Marina [facility]  NEUROLOGY [address]  Scheduled Appointment: 06/25/2025

## 2025-05-06 NOTE — PROGRESS NOTE ADULT - SUBJECTIVE AND OBJECTIVE BOX
EPILEPSY PROGRESS NOTE:  No events overnight. No complaints currently.    REVIEW OF SYSTEMS:  CONSTITUTIONAL:  No weight loss, fever, chills, weakness or fatigue.   HEENT:  Eyes:  No visual loss, blurred vision, double vision or yellow sclerae. Ears, Nose, Throat:  No hearing loss, sneezing, congestion, runny nose or sore throat.  SKIN:  No rash or itching.  CARDIOVASCULAR:  No chest pain, chest pressure or chest discomfort. No palpitations or edema.  RESPIRATORY:  No shortness of breath, cough or sputum.  GASTROINTESTINAL:  No anorexia, nausea, vomiting or diarrhea. No abdominal pain or blood.  GENITOURINARY: No Burning on urination.   NEUROLOGICAL:  see HPI  MUSCULOSKELETAL:  No muscle, back pain, joint pain or stiffness.    MEDICATIONS:   MEDICATIONS  (STANDING):  enoxaparin Injectable 40 milliGRAM(s) SubCutaneous every 24 hours    MEDICATIONS  (PRN):  acetaminophen     Tablet .. 650 milliGRAM(s) Oral every 6 hours PRN Temp greater or equal to 38C (100.4F), Mild Pain (1 - 3)  LORazepam   Injectable 2 milliGRAM(s) IV Push once PRN Seizure Activity      VITAL SIGNS:  Vital Signs Last 24 Hrs  T(C): 36.3 (06 May 2025 06:09), Max: 36.9 (05 May 2025 12:03)  T(F): 97.4 (06 May 2025 06:09), Max: 98.5 (05 May 2025 12:03)  HR: 61 (06 May 2025 06:09) (53 - 61)  BP: 107/68 (05 May 2025 20:47) (107/68 - 124/87)  BP(mean): --  RR: 18 (06 May 2025 06:09) (16 - 18)  SpO2: 97% (06 May 2025 06:09) (96% - 97%)    Parameters below as of 06 May 2025 06:09  Patient On (Oxygen Delivery Method): room air        PHYSICAL EXAM:  Constitutional: Appearance is consistent with chronologic age. No acute distress  Cardiovascular: Regular rate and rhythm, no murmurs, rubs, or gallops. Extremities are warm and well perfused. Capillary refill is less than 2 seconds. No carotid bruits.   Pulmonary: Anterior breath sounds clear bilaterally, no crackles or wheezing. No use of accessory muscles  GI: Positive bowel sounds. Abdomen soft, non-distended, non-tender. No guarding or rebound. No masses.  Extremities: No significant deformity or joint abnormality. Radial and DP pulses +2, No edema.  Skin: normal color, texture and turgor with no lesions or eruptions.    Neurologic:  -Mental status: Awake, alert, oriented to person, place, and date. Speech is fluent with intact naming, repetition, and comprehension, no dysarthria. Recent and remote memory intact. Follows commands. Attention/concentration intact.   -Cranial nerves:   II: Visual fields are full to confrontation.  III, IV, VI: Extraocular movements are intact without nystagmus. Pupils equally round and reactive to light. 3mm Brisk.   V:  Facial sensation V1-V3 equal and intact   VII: Face is symmetric with normal eye closure and smile  VIII: Hearing is bilaterally intact to finger rub  IX, X: Uvula is midline and soft palate rises symmetrically  XI: Head turning and shoulder shrug are intact.  XII: Tongue protrudes midline  Motor: Normal bulk and tone. No pronator drift.   Strength:     [] Upper extremity                      Delt       Bicep    Tricep                                                  R         5/5        5/5        5/5       5/5                                               L          5/5        5/5        5/5       5/5  [] Lower extremity                       HF          KE          KF        DF         PF                                               R        5/5        5/5        5/5       5/5       5/5                                               L         5/5        5/5       5/5       5/5        5/5    Rapid alternating movements intact and symmetric  Sensation: Intact to light touch in all extremities.  Coordination: No dysmetria on finger-to-nose and heel-to-shin bilaterally  Reflexes: 2+ b/l biceps, triceps, patella and achilles. Plantar response downgoing b/l   Gait: Toe/heel/ Tandem/ Romberg    LABS:  CBC Full  -  ( 05 May 2025 14:45 )  WBC Count : 5.20 K/uL  RBC Count : 5.16 M/uL  Hemoglobin : 16.5 g/dL  Hematocrit : 47.1 %  Platelet Count - Automated : 253 K/uL  Mean Cell Volume : 91.3 fl  Mean Cell Hemoglobin : 32.0 pg  Mean Cell Hemoglobin Concentration : 35.0 g/dL  Auto Neutrophil # : x  Auto Lymphocyte # : x  Auto Monocyte # : x  Auto Eosinophil # : x  Auto Basophil # : x  Auto Neutrophil % : x  Auto Lymphocyte % : x  Auto Monocyte % : x  Auto Eosinophil % : x  Auto Basophil % : x    05-05    143  |  106  |  10  ----------------------------<  117[H]  5.7[H]   |  24  |  0.86    Ca    10.0      05 May 2025 15:12  Phos  3.9     05-05  Mg     2.3     05-05    TPro  7.2  /  Alb  5.0  /  TBili  0.6  /  DBili  x   /  AST  16  /  ALT  17  /  AlkPhos  64  05-05    LIVER FUNCTIONS - ( 05 May 2025 15:12 )  Alb: 5.0 g/dL / Pro: 7.2 g/dL / ALK PHOS: 64 U/L / ALT: 17 U/L / AST: 16 U/L / GGT: x           PT/INR - ( 05 May 2025 14:45 )   PT: 10.9 sec;   INR: 0.95     PTT - ( 05 May 2025 14:45 )  PTT:33.3 sec      Radiology and Other Testings:    EEG: EPILEPSY PROGRESS NOTE:  Patient seen at bedside this morning. Denies any seizures/auras/ events overnight. Reported he slept well. No complaints currently.    REVIEW OF SYSTEMS:  CONSTITUTIONAL:  No weight loss, fever, chills, weakness or fatigue.   HEENT:  Eyes:  No visual loss, blurred vision, double vision or yellow sclerae. Ears, Nose, Throat:  No hearing loss, sneezing, congestion, runny nose or sore throat.  SKIN:  No rash or itching.  CARDIOVASCULAR:  No chest pain, chest pressure or chest discomfort. No palpitations or edema.  RESPIRATORY:  No shortness of breath, cough or sputum.  GASTROINTESTINAL:  No anorexia, nausea, vomiting or diarrhea. No abdominal pain or blood.  NEUROLOGICAL:  see HPI      MEDICATIONS:   MEDICATIONS  (STANDING):  enoxaparin Injectable 40 milliGRAM(s) SubCutaneous every 24 hours    MEDICATIONS  (PRN):  acetaminophen     Tablet .. 650 milliGRAM(s) Oral every 6 hours PRN Temp greater or equal to 38C (100.4F), Mild Pain (1 - 3)  LORazepam   Injectable 2 milliGRAM(s) IV Push once PRN Seizure Activity      VITAL SIGNS:  Vital Signs Last 24 Hrs  T(C): 36.3 (06 May 2025 06:09), Max: 36.9 (05 May 2025 12:03)  T(F): 97.4 (06 May 2025 06:09), Max: 98.5 (05 May 2025 12:03)  HR: 61 (06 May 2025 06:09) (53 - 61)  BP: 107/68 (05 May 2025 20:47) (107/68 - 124/87)  BP(mean): --  RR: 18 (06 May 2025 06:09) (16 - 18)  SpO2: 97% (06 May 2025 06:09) (96% - 97%)    Parameters below as of 06 May 2025 06:09  Patient On (Oxygen Delivery Method): room air        PHYSICAL EXAM:  Constitutional: Appearance is consistent with chronologic age. No acute distress    Neurologic:  -Mental status: Awake, alert, oriented to person, place, and date. Speech is fluent with intact naming, repetition, and comprehension, no dysarthria. Recent and remote memory intact. Follows commands. Attention/concentration intact.   -Cranial nerves:   II: Visual fields are full to confrontation.  III, IV, VI: Extraocular movements are intact without nystagmus. Pupils equally round and reactive to light. 3mm Brisk.   V:  Facial sensation V1-V3 equal and intact   VII: Face is symmetric with normal eye closure and smile  VIII: Hearing is bilaterally intact to finger rub  IX, X: Uvula is midline and soft palate rises symmetrically  XI: Head turning and shoulder shrug are intact.  XII: Tongue protrudes midline  Motor: Normal bulk and tone. No pronator drift.   Strength:     [] Upper extremity                      Delt       Bicep    Tricep                                                  R         5/5 5/5 5/5 5/5                                               L          5/5 5/5 5/5 5/5  [] Lower extremity                       HF          KE          KF        DF         PF                                               R        5/5 5/5 5/5 5/5 5/5                                               L         5/5 5/5 5/5 5/5 5/5      Sensation: Intact to light touch in all extremities.  Coordination: No dysmetria on finger-to-nose bilaterally  Reflexes: 2+ b/l biceps, triceps, patella and achilles. Plantar response downgoing b/l   Gait: Deferred    LABS:  CBC Full  -  ( 05 May 2025 14:45 )  WBC Count : 5.20 K/uL  RBC Count : 5.16 M/uL  Hemoglobin : 16.5 g/dL  Hematocrit : 47.1 %  Platelet Count - Automated : 253 K/uL  Mean Cell Volume : 91.3 fl  Mean Cell Hemoglobin : 32.0 pg  Mean Cell Hemoglobin Concentration : 35.0 g/dL  Auto Neutrophil # : x  Auto Lymphocyte # : x  Auto Monocyte # : x  Auto Eosinophil # : x  Auto Basophil # : x  Auto Neutrophil % : x  Auto Lymphocyte % : x  Auto Monocyte % : x  Auto Eosinophil % : x  Auto Basophil % : x    05-05    143  |  106  |  10  ----------------------------<  117[H]  5.7[H]   |  24  |  0.86    Ca    10.0      05 May 2025 15:12  Phos  3.9     05-05  Mg     2.3     05-05    TPro  7.2  /  Alb  5.0  /  TBili  0.6  /  DBili  x   /  AST  16  /  ALT  17  /  AlkPhos  64  05-05    LIVER FUNCTIONS - ( 05 May 2025 15:12 )  Alb: 5.0 g/dL / Pro: 7.2 g/dL / ALK PHOS: 64 U/L / ALT: 17 U/L / AST: 16 U/L / GGT: x           PT/INR - ( 05 May 2025 14:45 )   PT: 10.9 sec;   INR: 0.95     PTT - ( 05 May 2025 14:45 )  PTT:33.3 sec

## 2025-05-06 NOTE — CONSULT NOTE ADULT - ASSESSMENT
49M prior sz, here for elective EMU admission under vEEG, found to have hyperK     #Hyperkalemia - new issue.   #Seizure - mgmt per epilepsy team    Plan  Repeat BMP - K 4.9. EKG yesterday and today wo T wave changes. Suspect possibly lab artifact/error.  Monitor clinically  continue vEEG per epilepsy management    Above d/w neurology/epilepsy NP

## 2025-05-06 NOTE — PROGRESS NOTE ADULT - PROBLEM SELECTOR PLAN 2
Nutrition & Prophylaxis:    F: None  E:  K>4, Mg>2, Phos >3  N: Regular diet  DVT PPx: SCDs + lovenox 40mg QHS  GI PPx: None  Dispo: EMU  Code: FULL CODE.

## 2025-05-06 NOTE — CONSULT NOTE ADULT - SUBJECTIVE AND OBJECTIVE BOX
SURGERY CONSULT  ==============================================================================================================  HPI: 49y Male  HPI:  48 y/o RHM with PMHx of hemochromatosis, eye surgery and 1 lifetime GTC seizure presents for elective EMU admission for seizure characterization while undergoing vEEG monitoring. Patient endorsed new onset convulsive seizure in March 2024 when he was visiting [place], [place]. He reported he was at a hotel's indoor pool, the pool was packed with people, the air was hot/muggy, it was loud/echoy and the floors were slippery. He went ahead and stood up and started feeling lightheaded, started seeing a prism lens of colors, and fell. He's unsure if he fell due to slipping or due to the seizure. Seizure was witnessed by his youngest (daughter) and one of his sons, who instructed the youngest to watch while he went to find help. His wife and 2 older sons came to the scene and drove him to the hospital as there was no /camera or staff on duty and no one called EMS. He endorsed tongue and an unknown period of confusion/unconsiousness/semi-consciousness. He stated he was back to baseline when they got to the ER. He endorses a lot of built up stress throughout the years from work, financial and life stressors. Prior to seizure he was laid off and reported lack of  quality sleep. Of note, experiencing episodes of unresponsiveness and hot/cold flashes in weeks and months leading up to episode in 2024. Patient has stopped taking Keppra because of depressed mood and fatigue side effects and was transitioned to Vimpat but stopped taking that as well because he didn't like how it made him feel, "foggy and accident-prone". Since episode in March 2024, patient denies any auras/events/seizures. CTH and labs in OSH ER were unrevealing except for transaminitis likely related to hemachromatosis.   Denies any urinary or bowel incontinence head trauma with LOC prior to 2024, febrile seizures, family history of seizures, meningitis, or encephalitis. MRI brain from 9/2024 normal as per outpatient chart. EEG from OSH questionably epileptiform L temporal sharp wave.   (05 May 2025 12:15)    Additional med consult hx  49M prior sz, here for elective EMU admission under vEEG, found to have hyperK     No prior hx of hyperkalemia - last K in mid 4s in 05/2025. Denies changes to medications. States after his seizure he tried to optimize his life further - cut down on alcohol and worked out a lot more. Denies fever, chills, sweats, chest pain, dyspnea. Denies any changes in his diet recently but states over the course of months has been trying to eat better - resulting in 10-15lb intentional wt loss.    ROS: 12 point ROS reviewed and otherwise negative per HPI  FH: No DVT/PE   SH: Non smoker    PAST MEDICAL & SURGICAL HISTORY:  History of hemochromatosis      H/O eye surgery        Home Meds: Home Medications:    Allergies: Allergies    Sulfacetamide Sodium (Other)    Intolerances      Soc:   Advanced Directives: Presumed Full Code     CURRENT MEDICATIONS:   --------------------------------------------------------------------------------------  Neurologic Medications  acetaminophen     Tablet .. 650 milliGRAM(s) Oral every 6 hours PRN Temp greater or equal to 38C (100.4F), Mild Pain (1 - 3)  LORazepam   Injectable 2 milliGRAM(s) IV Push once PRN Seizure Activity    Respiratory Medications    Cardiovascular Medications    Gastrointestinal Medications    Genitourinary Medications    Hematologic/Oncologic Medications  enoxaparin Injectable 40 milliGRAM(s) SubCutaneous every 24 hours    Antimicrobial/Immunologic Medications    Endocrine/Metabolic Medications    Topical/Other Medications    --------------------------------------------------------------------------------------    VITAL SIGNS, INS/OUTS (last 24 hours):  --------------------------------------------------------------------------------------  ICU Vital Signs Last 24 Hrs  T(C): 37.4 (06 May 2025 12:55), Max: 37.4 (06 May 2025 12:55)  T(F): 99.3 (06 May 2025 12:55), Max: 99.3 (06 May 2025 12:55)  HR: 54 (06 May 2025 12:55) (53 - 61)  BP: 106/68 (06 May 2025 12:55) (106/68 - 107/68)  BP(mean): --  ABP: --  ABP(mean): --  RR: 16 (06 May 2025 12:55) (16 - 18)  SpO2: 98% (06 May 2025 12:55) (96% - 98%)    O2 Parameters below as of 06 May 2025 12:55  Patient On (Oxygen Delivery Method): room air          I&O's Summary    --------------------------------------------------------------------------------------    EXAM:  GEN: Male in NAD on RA  HEENT: NC/AT, MMM  CV: RRR, nml S1S2, no murmurs  PULM: nml effort, CTAB  ABD: Soft, non-distended, NABS, non-tender  NEURO  A/O x3, moving all extremities, Sensation intact  No facial droop, EOMI  5/5 in BUE and BLE. nml finger to nose  PSYCH: Appropriate      LABS  --------------------------------------------------------------------------------------  Labs:  CAPILLARY BLOOD GLUCOSE                              16.5   5.20  )-----------( 253      ( 05 May 2025 14:45 )             47.1         05-06    143  |  111[H]  |  8   ----------------------------<  84  4.9   |  26  |  0.80      Calcium: 8.5 mg/dL (05-06-25 @ 12:00)      LFTs:             7.2  | 0.6  | 16       ------------------[64      ( 05 May 2025 15:12 )  5.0  | x    | 17          Lipase:x      Amylase:x             Coags:     10.9   ----< 0.95    ( 05 May 2025 14:45 )     33.3                Urinalysis Basic - ( 06 May 2025 12:00 )    Color: x / Appearance: x / SG: x / pH: x  Gluc: 84 mg/dL / Ketone: x  / Bili: x / Urobili: x   Blood: x / Protein: x / Nitrite: x   Leuk Esterase: x / RBC: x / WBC x   Sq Epi: x / Non Sq Epi: x / Bacteria: x          --------------------------------------------------------------------------------------    OTHER LABS    IMAGING RESULTS  ****************

## 2025-05-06 NOTE — PROGRESS NOTE ADULT - ASSESSMENT
50 y/o RHM with PMHx of seizures presents for elective EMU admission for seizure characterization with medication reduction while undergoing vEEG monitoring. vEEG shows thus far 48 y/o RHM with PMHx of seizures presents for elective EMU admission for seizure characterization with medication reduction while undergoing vEEG monitoring. vEEG shows no epileptiform activity thus far. Normal sleep/awake.

## 2025-05-06 NOTE — DISCHARGE NOTE PROVIDER - HOSPITAL COURSE
EPILEPSY DISCHARGE NOTE:   HPI:  48 y/o RHM with PMHx of hemochromatosis, eye surgery and 1 lifetime GTC seizure presents for elective EMU admission for seizure characterization while undergoing vEEG monitoring. Patient endorsed new onset convulsive seizure in March 2024 when he was visiting [place], [place]. He reported he was at a hotel's indoor pool, the pool was packed with people, the air was hot/muggy, it was loud/ echoey and the floors were slippery. He went ahead and stood up and started feeling lightheaded, started seeing a prism lens of colors, and fell. He's unsure if he fell due to slipping or due to the seizure. Seizure was witnessed by his youngest (daughter) and one of his sons, who instructed the youngest to watch while he went to find help. His wife and 2 older sons came to the scene and drove him to the hospital as there was no /camera or staff on duty and no one called EMS. He endorsed tongue and an unknown period of confusion/ unconsciousness/ semi-consciousness. He stated he was back to baseline when they got to the ER. He endorses a lot of built up stress throughout the years from work, financial and life stressors. Prior to seizure he was laid off and reported lack of  quality sleep. Of note, experiencing episodes of unresponsiveness and hot/cold flashes in weeks and months leading up to episode in 2024. Patient has stopped taking Keppra because of depressed mood and fatigue side effects and was transitioned to Vimpat but stopped taking that as well because he didn't like how it made him feel, "foggy and accident-prone". Since episode in March 2024, patient denies any auras/events/seizures. CTH and labs in OSH ER were unrevealing except for transaminitis likely related to hemachromatosis.   Denies any urinary or bowel incontinence head trauma with LOC prior to 2024, febrile seizures, family history of seizures, meningitis, or encephalitis. MRI brain from 9/2024 normal as per outpatient chart. EEG from OSH questionably epileptiform L temporal sharp wave.   (05 May 2025 12:15)    Hospital course include vEEG monitoring placed........, and EEG showed.... from date to date. Additional tests performed on date, and the results revealed ...... Medical issues and the interventions (if any). Patient is neurologically stable and medically cleared for discharge to home/subacute rehab/acute rehab.     Medications adjusted:    New Medications:    IMAGING/PROCEDURE:    EDUCATION:   Patient given written education on SUDEP: YES    Follow-up:   Please follow-up with  (neurologist) in 4 - 6 weeks ( will call you with a date and time).   Please follow-up with your PCP in 2 weeks  Please follow-up with other specialists in 2 - 4 weeks.       EPILEPSY DISCHARGE NOTE:   HPI:  48 y/o RHM with PMHx of hemochromatosis, eye surgery and 1 lifetime GTC seizure presents for elective EMU admission for seizure characterization while undergoing vEEG monitoring. Patient endorsed new onset convulsive seizure in March 2024 when he was visiting [place], [place]. He reported he was at a hotel's indoor pool, the pool was packed with people, the air was hot/muggy, it was loud/ echoey and the floors were slippery. He went ahead and stood up and started feeling lightheaded, started seeing a prism lens of colors, and fell. He's unsure if he fell due to slipping or due to the seizure. Seizure was witnessed by his youngest (daughter) and one of his sons, who instructed the youngest to watch while he went to find help. His wife and 2 older sons came to the scene and drove him to the hospital as there was no /camera or staff on duty and no one called EMS. He endorsed tongue and an unknown period of confusion/ unconsciousness/ semi-consciousness. He stated he was back to baseline when they got to the ER. He endorses a lot of built up stress throughout the years from work, financial and life stressors. Prior to seizure he was laid off and reported lack of  quality sleep. Of note, experiencing episodes of unresponsiveness and hot/cold flashes in weeks and months leading up to episode in 2024. Patient has stopped taking Keppra because of depressed mood and fatigue side effects and was transitioned to Vimpat but stopped taking that as well because he didn't like how it made him feel, "foggy and accident-prone". Since episode in March 2024, patient denies any auras/events/seizures. CTH and labs in OSH ER were unrevealing except for transaminitis likely related to hemachromatosis.   Denies any urinary or bowel incontinence head trauma with LOC prior to 2024, febrile seizures, family history of seizures, meningitis, or encephalitis. MRI brain from 9/2024 normal as per outpatient chart. EEG from OSH questionably epileptiform L temporal sharp wave.   (05 May 2025 12:15)    Hospital course include vEEG monitoring placed 5/5/2025-5/7/2025 and EEG showed no epileptiform discharges or seizures recorded.  Patient is neurologically stable and medically cleared for discharge to home/subacute rehab/acute rehab.       Follow-up:   Please follow-up with Dr Marina in 4 - 6 weeks ( will call you with a date and time).   Please follow-up with your PCP in 2 weeks  Please follow-up with other specialists in 2 - 4 weeks.

## 2025-05-06 NOTE — PROGRESS NOTE ADULT - PROBLEM SELECTOR PLAN 1
Hx of an episode of convulsive seizure in 2024, seizure free for 1 year,  EMU Admission with plans for med reduction to determine sz risk     - vEEG monitoring  - Ativan 2mg IVP PRN for seizures > 2mins  - Follow up Labs and AED levels  - Neurological & Vitals assessment Q8hrs  - Maintain Seizure/Fall/Aspiration precautions. Hx of an episode of convulsive seizure in 2024, seizure free for 1 year,  EMU Admission with plans for med reduction to determine sz risk     - vEEG monitoring  - provoking factors: sleep deprivation, PS, HV today.   - Ativan 2mg IVP PRN for seizures > 2mins  - Follow up Labs and AED levels  - Neurological & Vitals assessment Q8hrs  - Maintain Seizure/Fall/Aspiration precautions.

## 2025-05-06 NOTE — DISCHARGE NOTE PROVIDER - NSDCCPCAREPLAN_GEN_ALL_CORE_FT
PRINCIPAL DISCHARGE DIAGNOSIS  Diagnosis: History of seizure  Assessment and Plan of Treatment: A seizure is abnormal electrical activity in the brain; the specific cause may or may not be found. Prior to a seizure you may experience a warning sensation (aura) that may include fear, nausea, dizziness, and visual changes such as flashing lights of spots. Common symptoms during the seizure may include an altered mental status, rhythmic jerking movements, drooling, grunting, loss of bladder or bowel control, or tongue biting. After a seizure, you may feel confused and sleepy.   Teach friends and family what to do if you HAVE a seizure which includes laying you on the ground with your head on a cushion and turning you to the side to keep your breathing passages clear in case of vomiting.  SEEK IMMEDIATE MEDICAL CARE IF YOU HAVE ANY OF THE FOLLOWING SYMPTOMS: seizure lasting over 5 minutes, not waking up or persistent altered mental status after the seizure, or more frequent or worsening seizures.  Medicine side effects  *Skin rash  *Fever of 100.4°F (38°C) or higher, or as directed by your provider  *Symptoms get worse or you have new symptoms

## 2025-05-07 ENCOUNTER — TRANSCRIPTION ENCOUNTER (OUTPATIENT)
Age: 50
End: 2025-05-07

## 2025-05-07 VITALS
OXYGEN SATURATION: 97 % | DIASTOLIC BLOOD PRESSURE: 72 MMHG | SYSTOLIC BLOOD PRESSURE: 116 MMHG | RESPIRATION RATE: 17 BRPM | TEMPERATURE: 98 F | HEART RATE: 62 BPM

## 2025-05-07 PROCEDURE — 95720 EEG PHY/QHP EA INCR W/VEEG: CPT

## 2025-05-07 PROCEDURE — 99239 HOSP IP/OBS DSCHRG MGMT >30: CPT

## 2025-05-07 PROCEDURE — 99231 SBSQ HOSP IP/OBS SF/LOW 25: CPT

## 2025-05-07 NOTE — DISCHARGE NOTE NURSING/CASE MANAGEMENT/SOCIAL WORK - FINANCIAL ASSISTANCE
[facility] provides services at a reduced cost to those who are determined to be eligible through [facility]’s financial assistance program. Information regarding [facility]’s financial assistance program can be found by going to [email or web address] or by calling [phone].

## 2025-05-07 NOTE — PROGRESS NOTE ADULT - SUBJECTIVE AND OBJECTIVE BOX
INTERVAL HPI/OVERNIGHT EVENTS: farhan o/n    SUBJECTIVE: Patient seen and examined at bedside.   CC: f/u hyperkalemia  Pt feels well. No episodes of headache, nausea, vomiting, numbness, weakness. No LH/dizziness, chest pain, dyspnea. No fevers. Eager to return home.     OBJECTIVE:    VITAL SIGNS:  ICU Vital Signs Last 24 Hrs  T(C): 36.8 (07 May 2025 12:38), Max: 36.8 (06 May 2025 20:58)  T(F): 98.3 (07 May 2025 12:38), Max: 98.3 (06 May 2025 20:58)  HR: 62 (07 May 2025 12:38) (57 - 62)  BP: 116/72 (07 May 2025 12:38) (115/71 - 118/72)  BP(mean): 85 (07 May 2025 06:02) (85 - 85)  ABP: --  ABP(mean): --  RR: 17 (07 May 2025 12:38) (17 - 18)  SpO2: 97% (07 May 2025 12:38) (96% - 97%)    O2 Parameters below as of 07 May 2025 12:38  Patient On (Oxygen Delivery Method): room air              CAPILLARY BLOOD GLUCOSE          PHYSICAL EXAM:  GEN: Male in NAD on RA  HEENT: NC/AT, MMM  CV: RRR, nml S1S2, no murmurs  PULM: nml effort, CTAB  ABD: Soft, non-distended, NABS, non-tender  NEURO  A/O x3, moving all extremities, Sensation intact  No facial droop, EOMI  5/5 in BUE and BLE. nml finger to nose  PSYCH: Appropriate    MEDICATIONS:  MEDICATIONS  (STANDING):  enoxaparin Injectable 40 milliGRAM(s) SubCutaneous every 24 hours    MEDICATIONS  (PRN):  acetaminophen     Tablet .. 650 milliGRAM(s) Oral every 6 hours PRN Temp greater or equal to 38C (100.4F), Mild Pain (1 - 3)  LORazepam   Injectable 2 milliGRAM(s) IV Push once PRN Seizure Activity      ALLERGIES:  Allergies    Sulfacetamide Sodium (Other)    Intolerances        LABS:                        16.5   5.20  )-----------( 253      ( 05 May 2025 14:45 )             47.1     05-06    143  |  111[H]  |  8   ----------------------------<  84  4.9   |  26  |  0.80    Ca    8.5      06 May 2025 12:00  Phos  3.9     05-05  Mg     2.3     05-05    TPro  7.2  /  Alb  5.0  /  TBili  0.6  /  DBili  x   /  AST  16  /  ALT  17  /  AlkPhos  64  05-05    PT/INR - ( 05 May 2025 14:45 )   PT: 10.9 sec;   INR: 0.95          PTT - ( 05 May 2025 14:45 )  PTT:33.3 sec  Urinalysis Basic - ( 06 May 2025 12:00 )    Color: x / Appearance: x / SG: x / pH: x  Gluc: 84 mg/dL / Ketone: x  / Bili: x / Urobili: x   Blood: x / Protein: x / Nitrite: x   Leuk Esterase: x / RBC: x / WBC x   Sq Epi: x / Non Sq Epi: x / Bacteria: x        RADIOLOGY & ADDITIONAL TESTS: Reviewed.

## 2025-05-07 NOTE — DISCHARGE NOTE NURSING/CASE MANAGEMENT/SOCIAL WORK - PATIENT PORTAL LINK FT
You can access the FollowMyHealth Patient Portal offered by [facility] by registering at the following website: [email or web address]. By joining [employer]’s FollowMyHealth portal, you will also be able to view your health information using other applications (apps) compatible with our system.

## 2025-05-07 NOTE — PROGRESS NOTE ADULT - ASSESSMENT
49M prior sz, here for elective EMU admission under vEEG, found to have hyperK     #Hyperkalemia - new issue. Repeat K 4.9 - improved. EKG Reviewed - no T wave changes.   #Seizure - mgmt per epilepsy team    Plan  Discussed result of repeat K with patient - counseled to monitor intake of foods high in potassium.   Monitor clinically and f/u with outpatient PCP  Optimized for dc medically    Above d/w neurology/epilepsy NP

## 2025-05-08 ENCOUNTER — NON-APPOINTMENT (OUTPATIENT)
Age: 50
End: 2025-05-08

## 2025-05-08 LAB — LEVETIRACETAM SERPL-MCNC: <2 UG/ML — LOW (ref 10–40)

## 2025-05-12 LAB — LACOSAMIDE (VIMPAT) RESULT: <0.5 UG/ML — LOW (ref 5–10)

## 2025-05-13 ENCOUNTER — NON-APPOINTMENT (OUTPATIENT)
Age: 50
End: 2025-05-13

## 2025-05-13 DIAGNOSIS — Z88.2 ALLERGY STATUS TO SULFONAMIDES: ICD-10-CM

## 2025-05-13 DIAGNOSIS — G40.909 EPILEPSY, UNSPECIFIED, NOT INTRACTABLE, WITHOUT STATUS EPILEPTICUS: ICD-10-CM

## 2025-05-13 DIAGNOSIS — E83.119 HEMOCHROMATOSIS, UNSPECIFIED: ICD-10-CM

## 2025-05-13 DIAGNOSIS — E87.5 HYPERKALEMIA: ICD-10-CM

## 2025-05-14 ENCOUNTER — APPOINTMENT (OUTPATIENT)
Dept: NEUROLOGY | Facility: CLINIC | Age: 50
End: 2025-05-14
Payer: MEDICAID

## 2025-05-14 VITALS
HEART RATE: 86 BPM | SYSTOLIC BLOOD PRESSURE: 120 MMHG | HEIGHT: 73 IN | DIASTOLIC BLOOD PRESSURE: 84 MMHG | WEIGHT: 173 LBS | BODY MASS INDEX: 22.93 KG/M2 | TEMPERATURE: 97.1 F | OXYGEN SATURATION: 97 %

## 2025-05-14 PROCEDURE — 99214 OFFICE O/P EST MOD 30 MIN: CPT

## 2025-05-14 PROCEDURE — G2211 COMPLEX E/M VISIT ADD ON: CPT | Mod: NC

## 2025-06-25 ENCOUNTER — APPOINTMENT (OUTPATIENT)
Dept: NEUROLOGY | Facility: CLINIC | Age: 50
End: 2025-06-25